# Patient Record
Sex: MALE | Race: BLACK OR AFRICAN AMERICAN | NOT HISPANIC OR LATINO | Employment: FULL TIME | URBAN - METROPOLITAN AREA
[De-identification: names, ages, dates, MRNs, and addresses within clinical notes are randomized per-mention and may not be internally consistent; named-entity substitution may affect disease eponyms.]

---

## 2024-02-26 ENCOUNTER — APPOINTMENT (OUTPATIENT)
Dept: RADIOLOGY | Facility: HOSPITAL | Age: 54
End: 2024-02-26
Payer: COMMERCIAL

## 2024-02-26 ENCOUNTER — HOSPITAL ENCOUNTER (OUTPATIENT)
Facility: HOSPITAL | Age: 54
Setting detail: OBSERVATION
Discharge: HOME | End: 2024-02-27
Attending: STUDENT IN AN ORGANIZED HEALTH CARE EDUCATION/TRAINING PROGRAM | Admitting: INTERNAL MEDICINE
Payer: COMMERCIAL

## 2024-02-26 ENCOUNTER — APPOINTMENT (OUTPATIENT)
Dept: CARDIOLOGY | Facility: HOSPITAL | Age: 54
End: 2024-02-26
Payer: COMMERCIAL

## 2024-02-26 DIAGNOSIS — I48.19 PERSISTENT ATRIAL FIBRILLATION (MULTI): ICD-10-CM

## 2024-02-26 DIAGNOSIS — I50.9 ACUTE CONGESTIVE HEART FAILURE, UNSPECIFIED HEART FAILURE TYPE (MULTI): Primary | ICD-10-CM

## 2024-02-26 DIAGNOSIS — I50.20 UNSPECIFIED SYSTOLIC (CONGESTIVE) HEART FAILURE (MULTI): ICD-10-CM

## 2024-02-26 DIAGNOSIS — I10 HYPERTENSION, UNSPECIFIED TYPE: ICD-10-CM

## 2024-02-26 DIAGNOSIS — I10 PRIMARY HYPERTENSION: ICD-10-CM

## 2024-02-26 DIAGNOSIS — I10 ESSENTIAL HYPERTENSION: Chronic | ICD-10-CM

## 2024-02-26 DIAGNOSIS — N17.9 AKI (ACUTE KIDNEY INJURY) (CMS-HCC): ICD-10-CM

## 2024-02-26 LAB
ALBUMIN SERPL BCP-MCNC: 4.1 G/DL (ref 3.4–5)
ALP SERPL-CCNC: 68 U/L (ref 33–120)
ALT SERPL W P-5'-P-CCNC: 13 U/L (ref 10–52)
ANION GAP SERPL CALC-SCNC: 12 MMOL/L (ref 10–20)
AST SERPL W P-5'-P-CCNC: 15 U/L (ref 9–39)
BASOPHILS # BLD AUTO: 0.06 X10*3/UL (ref 0–0.1)
BASOPHILS NFR BLD AUTO: 1 %
BILIRUB SERPL-MCNC: 1.6 MG/DL (ref 0–1.2)
BNP SERPL-MCNC: 669 PG/ML (ref 0–99)
BUN SERPL-MCNC: 22 MG/DL (ref 6–23)
CALCIUM SERPL-MCNC: 9.3 MG/DL (ref 8.6–10.3)
CARDIAC TROPONIN I PNL SERPL HS: 30 NG/L (ref 0–20)
CARDIAC TROPONIN I PNL SERPL HS: 33 NG/L (ref 0–20)
CHLORIDE SERPL-SCNC: 105 MMOL/L (ref 98–107)
CO2 SERPL-SCNC: 26 MMOL/L (ref 21–32)
CREAT SERPL-MCNC: 1.61 MG/DL (ref 0.5–1.3)
EGFRCR SERPLBLD CKD-EPI 2021: 51 ML/MIN/1.73M*2
EOSINOPHIL # BLD AUTO: 0.22 X10*3/UL (ref 0–0.7)
EOSINOPHIL NFR BLD AUTO: 3.5 %
ERYTHROCYTE [DISTWIDTH] IN BLOOD BY AUTOMATED COUNT: 15.4 % (ref 11.5–14.5)
GLUCOSE SERPL-MCNC: 107 MG/DL (ref 74–99)
HCT VFR BLD AUTO: 40.5 % (ref 41–52)
HGB BLD-MCNC: 13.3 G/DL (ref 13.5–17.5)
HOLD SPECIMEN: NORMAL
IMM GRANULOCYTES # BLD AUTO: 0.02 X10*3/UL (ref 0–0.7)
IMM GRANULOCYTES NFR BLD AUTO: 0.3 % (ref 0–0.9)
LYMPHOCYTES # BLD AUTO: 1.34 X10*3/UL (ref 1.2–4.8)
LYMPHOCYTES NFR BLD AUTO: 21.3 %
MCH RBC QN AUTO: 30.2 PG (ref 26–34)
MCHC RBC AUTO-ENTMCNC: 32.8 G/DL (ref 32–36)
MCV RBC AUTO: 92 FL (ref 80–100)
MONOCYTES # BLD AUTO: 0.62 X10*3/UL (ref 0.1–1)
MONOCYTES NFR BLD AUTO: 9.8 %
NEUTROPHILS # BLD AUTO: 4.04 X10*3/UL (ref 1.2–7.7)
NEUTROPHILS NFR BLD AUTO: 64.1 %
NRBC BLD-RTO: 0 /100 WBCS (ref 0–0)
PLATELET # BLD AUTO: 160 X10*3/UL (ref 150–450)
POTASSIUM SERPL-SCNC: 3.8 MMOL/L (ref 3.5–5.3)
PROT SERPL-MCNC: 7.1 G/DL (ref 6.4–8.2)
RBC # BLD AUTO: 4.4 X10*6/UL (ref 4.5–5.9)
SODIUM SERPL-SCNC: 139 MMOL/L (ref 136–145)
WBC # BLD AUTO: 6.3 X10*3/UL (ref 4.4–11.3)

## 2024-02-26 PROCEDURE — 85025 COMPLETE CBC W/AUTO DIFF WBC: CPT | Performed by: STUDENT IN AN ORGANIZED HEALTH CARE EDUCATION/TRAINING PROGRAM

## 2024-02-26 PROCEDURE — 36415 COLL VENOUS BLD VENIPUNCTURE: CPT | Performed by: STUDENT IN AN ORGANIZED HEALTH CARE EDUCATION/TRAINING PROGRAM

## 2024-02-26 PROCEDURE — 93005 ELECTROCARDIOGRAM TRACING: CPT

## 2024-02-26 PROCEDURE — 71046 X-RAY EXAM CHEST 2 VIEWS: CPT | Mod: FOREIGN READ | Performed by: RADIOLOGY

## 2024-02-26 PROCEDURE — 2500000004 HC RX 250 GENERAL PHARMACY W/ HCPCS (ALT 636 FOR OP/ED): Performed by: STUDENT IN AN ORGANIZED HEALTH CARE EDUCATION/TRAINING PROGRAM

## 2024-02-26 PROCEDURE — 96375 TX/PRO/DX INJ NEW DRUG ADDON: CPT | Mod: 59

## 2024-02-26 PROCEDURE — 84484 ASSAY OF TROPONIN QUANT: CPT | Performed by: STUDENT IN AN ORGANIZED HEALTH CARE EDUCATION/TRAINING PROGRAM

## 2024-02-26 PROCEDURE — 2500000001 HC RX 250 WO HCPCS SELF ADMINISTERED DRUGS (ALT 637 FOR MEDICARE OP): Performed by: STUDENT IN AN ORGANIZED HEALTH CARE EDUCATION/TRAINING PROGRAM

## 2024-02-26 PROCEDURE — 99285 EMERGENCY DEPT VISIT HI MDM: CPT | Mod: 25

## 2024-02-26 PROCEDURE — 96374 THER/PROPH/DIAG INJ IV PUSH: CPT | Mod: 59

## 2024-02-26 PROCEDURE — 80053 COMPREHEN METABOLIC PANEL: CPT | Performed by: STUDENT IN AN ORGANIZED HEALTH CARE EDUCATION/TRAINING PROGRAM

## 2024-02-26 PROCEDURE — 71046 X-RAY EXAM CHEST 2 VIEWS: CPT

## 2024-02-26 PROCEDURE — 83880 ASSAY OF NATRIURETIC PEPTIDE: CPT | Performed by: STUDENT IN AN ORGANIZED HEALTH CARE EDUCATION/TRAINING PROGRAM

## 2024-02-26 RX ORDER — AMLODIPINE BESYLATE 10 MG/1
10 TABLET ORAL ONCE
Status: COMPLETED | OUTPATIENT
Start: 2024-02-26 | End: 2024-02-26

## 2024-02-26 RX ORDER — SPIRONOLACTONE 25 MG/1
25 TABLET ORAL
COMMUNITY
Start: 2022-10-17 | End: 2024-02-27 | Stop reason: SDUPTHER

## 2024-02-26 RX ORDER — NITROGLYCERIN 0.4 MG/1
0.4 TABLET SUBLINGUAL ONCE
Status: COMPLETED | OUTPATIENT
Start: 2024-02-26 | End: 2024-02-26

## 2024-02-26 RX ORDER — AMLODIPINE BESYLATE 10 MG/1
10 TABLET ORAL
COMMUNITY
Start: 2020-11-24 | End: 2024-02-27 | Stop reason: SDUPTHER

## 2024-02-26 RX ORDER — METOPROLOL SUCCINATE 25 MG/1
25 TABLET, EXTENDED RELEASE ORAL
COMMUNITY
Start: 2020-11-24 | End: 2024-02-27

## 2024-02-26 RX ORDER — CARVEDILOL 12.5 MG/1
1 TABLET ORAL
COMMUNITY
Start: 2022-10-17 | End: 2024-02-27

## 2024-02-26 RX ORDER — HYDRALAZINE HYDROCHLORIDE 20 MG/ML
10 INJECTION INTRAMUSCULAR; INTRAVENOUS ONCE
Status: COMPLETED | OUTPATIENT
Start: 2024-02-26 | End: 2024-02-26

## 2024-02-26 RX ORDER — FUROSEMIDE 10 MG/ML
40 INJECTION INTRAMUSCULAR; INTRAVENOUS ONCE
Status: COMPLETED | OUTPATIENT
Start: 2024-02-26 | End: 2024-02-26

## 2024-02-26 RX ORDER — CLONIDINE HYDROCHLORIDE 0.1 MG/1
0.1 TABLET ORAL ONCE
Status: COMPLETED | OUTPATIENT
Start: 2024-02-26 | End: 2024-02-26

## 2024-02-26 RX ORDER — ATORVASTATIN CALCIUM 40 MG/1
1 TABLET, FILM COATED ORAL DAILY
COMMUNITY
Start: 2023-10-10 | End: 2024-02-27 | Stop reason: SDUPTHER

## 2024-02-26 RX ADMIN — HYDRALAZINE HYDROCHLORIDE 10 MG: 20 INJECTION INTRAMUSCULAR; INTRAVENOUS at 19:10

## 2024-02-26 RX ADMIN — CLONIDINE HYDROCHLORIDE 0.1 MG: 0.1 TABLET ORAL at 21:24

## 2024-02-26 RX ADMIN — AMLODIPINE BESYLATE 10 MG: 10 TABLET ORAL at 21:24

## 2024-02-26 RX ADMIN — NITROGLYCERIN 0.4 MG: 0.4 TABLET SUBLINGUAL at 22:26

## 2024-02-26 RX ADMIN — FUROSEMIDE 40 MG: 10 INJECTION, SOLUTION INTRAMUSCULAR; INTRAVENOUS at 21:24

## 2024-02-26 ASSESSMENT — PAIN SCALES - GENERAL
PAINLEVEL_OUTOF10: 0 - NO PAIN

## 2024-02-26 ASSESSMENT — COLUMBIA-SUICIDE SEVERITY RATING SCALE - C-SSRS
6. HAVE YOU EVER DONE ANYTHING, STARTED TO DO ANYTHING, OR PREPARED TO DO ANYTHING TO END YOUR LIFE?: NO
2. HAVE YOU ACTUALLY HAD ANY THOUGHTS OF KILLING YOURSELF?: NO
1. IN THE PAST MONTH, HAVE YOU WISHED YOU WERE DEAD OR WISHED YOU COULD GO TO SLEEP AND NOT WAKE UP?: NO

## 2024-02-26 ASSESSMENT — PAIN - FUNCTIONAL ASSESSMENT: PAIN_FUNCTIONAL_ASSESSMENT: 0-10

## 2024-02-26 NOTE — ED TRIAGE NOTES
PT PRESENTS TO THE ED FOR HTN, AND JUNCTIONAL BRADYCARDIA. PT ENDORSES THAT HE WAS AT  AND THEY TOLD HIM TO COME TO THE ED. PT STATES THAT HE RAN OUT OF HIS BP MEDICATION 2 WEEKS AGO. PT DENIES CHEST PAIN, OR A HEADACHE. PT DENIES SOB WITH THIS.

## 2024-02-27 ENCOUNTER — PHARMACY VISIT (OUTPATIENT)
Dept: PHARMACY | Facility: CLINIC | Age: 54
End: 2024-02-27
Payer: MEDICARE

## 2024-02-27 ENCOUNTER — APPOINTMENT (OUTPATIENT)
Dept: CARDIOLOGY | Facility: HOSPITAL | Age: 54
End: 2024-02-27
Payer: COMMERCIAL

## 2024-02-27 VITALS
TEMPERATURE: 96.8 F | SYSTOLIC BLOOD PRESSURE: 141 MMHG | WEIGHT: 165 LBS | RESPIRATION RATE: 17 BRPM | DIASTOLIC BLOOD PRESSURE: 76 MMHG | HEART RATE: 52 BPM | HEIGHT: 69 IN | OXYGEN SATURATION: 96 % | BODY MASS INDEX: 24.44 KG/M2

## 2024-02-27 PROBLEM — R00.1 BRADYCARDIA: Status: ACTIVE | Noted: 2024-02-27

## 2024-02-27 PROBLEM — I50.30 (HFPEF) HEART FAILURE WITH PRESERVED EJECTION FRACTION (MULTI): Chronic | Status: ACTIVE | Noted: 2024-02-27

## 2024-02-27 PROBLEM — I48.91 ATRIAL FIBRILLATION (MULTI): Status: ACTIVE | Noted: 2024-02-27

## 2024-02-27 PROBLEM — Z91.199 NONCOMPLIANCE: Status: ACTIVE | Noted: 2024-02-27

## 2024-02-27 PROBLEM — N18.2 CKD (CHRONIC KIDNEY DISEASE) STAGE 2, GFR 60-89 ML/MIN: Chronic | Status: ACTIVE | Noted: 2024-02-27

## 2024-02-27 PROBLEM — E78.2 MIXED HYPERLIPIDEMIA: Chronic | Status: ACTIVE | Noted: 2024-02-27

## 2024-02-27 PROBLEM — I10 ESSENTIAL HYPERTENSION: Chronic | Status: ACTIVE | Noted: 2024-02-27

## 2024-02-27 PROBLEM — I10 HTN (HYPERTENSION) WITH GOAL TO BE DETERMINED: Status: ACTIVE | Noted: 2024-02-27

## 2024-02-27 LAB
ALBUMIN SERPL BCP-MCNC: 4.1 G/DL (ref 3.4–5)
ALP SERPL-CCNC: 67 U/L (ref 33–120)
ALT SERPL W P-5'-P-CCNC: 12 U/L (ref 10–52)
AMPHETAMINES UR QL SCN: NORMAL
ANION GAP SERPL CALC-SCNC: 16 MMOL/L (ref 10–20)
AORTIC VALVE MEAN GRADIENT: 2 MMHG
AORTIC VALVE PEAK VELOCITY: 1.08 M/S
AST SERPL W P-5'-P-CCNC: 15 U/L (ref 9–39)
AV PEAK GRADIENT: 4.7 MMHG
AVA (PEAK VEL): 3.23 CM2
AVA (VTI): 3.14 CM2
BARBITURATES UR QL SCN: NORMAL
BENZODIAZ UR QL SCN: NORMAL
BILIRUB DIRECT SERPL-MCNC: 0.4 MG/DL (ref 0–0.3)
BILIRUB SERPL-MCNC: 2.2 MG/DL (ref 0–1.2)
BUN SERPL-MCNC: 20 MG/DL (ref 6–23)
BZE UR QL SCN: NORMAL
CALCIUM SERPL-MCNC: 8.8 MG/DL (ref 8.6–10.3)
CANNABINOIDS UR QL SCN: NORMAL
CARDIAC TROPONIN I PNL SERPL HS: 30 NG/L (ref 0–20)
CHLORIDE SERPL-SCNC: 102 MMOL/L (ref 98–107)
CO2 SERPL-SCNC: 24 MMOL/L (ref 21–32)
CREAT SERPL-MCNC: 1.37 MG/DL (ref 0.5–1.3)
EGFRCR SERPLBLD CKD-EPI 2021: 61 ML/MIN/1.73M*2
EJECTION FRACTION APICAL 4 CHAMBER: 53.5
EJECTION FRACTION: 55 %
ERYTHROCYTE [DISTWIDTH] IN BLOOD BY AUTOMATED COUNT: 15.5 % (ref 11.5–14.5)
FENTANYL+NORFENTANYL UR QL SCN: NORMAL
GLUCOSE SERPL-MCNC: 93 MG/DL (ref 74–99)
HCT VFR BLD AUTO: 40.5 % (ref 41–52)
HGB BLD-MCNC: 13.1 G/DL (ref 13.5–17.5)
LEFT ATRIUM VOLUME AREA LENGTH INDEX BSA: 55.8 ML/M2
LEFT VENTRICLE INTERNAL DIMENSION DIASTOLE: 4.5 CM (ref 3.5–6)
LEFT VENTRICULAR OUTFLOW TRACT DIAMETER: 2.2 CM
MAGNESIUM SERPL-MCNC: 2.2 MG/DL (ref 1.6–2.4)
MCH RBC QN AUTO: 30.4 PG (ref 26–34)
MCHC RBC AUTO-ENTMCNC: 32.3 G/DL (ref 32–36)
MCV RBC AUTO: 94 FL (ref 80–100)
NRBC BLD-RTO: 0 /100 WBCS (ref 0–0)
OPIATES UR QL SCN: NORMAL
OXYCODONE+OXYMORPHONE UR QL SCN: NORMAL
PCP UR QL SCN: NORMAL
PLATELET # BLD AUTO: 147 X10*3/UL (ref 150–450)
POTASSIUM SERPL-SCNC: 3.6 MMOL/L (ref 3.5–5.3)
PROT SERPL-MCNC: 7 G/DL (ref 6.4–8.2)
RBC # BLD AUTO: 4.31 X10*6/UL (ref 4.5–5.9)
RIGHT VENTRICLE PEAK SYSTOLIC PRESSURE: 34.8 MMHG
SODIUM SERPL-SCNC: 138 MMOL/L (ref 136–145)
TRICUSPID ANNULAR PLANE SYSTOLIC EXCURSION: 1.5 CM
WBC # BLD AUTO: 7 X10*3/UL (ref 4.4–11.3)

## 2024-02-27 PROCEDURE — RXMED WILLOW AMBULATORY MEDICATION CHARGE

## 2024-02-27 PROCEDURE — G0378 HOSPITAL OBSERVATION PER HR: HCPCS

## 2024-02-27 PROCEDURE — 96376 TX/PRO/DX INJ SAME DRUG ADON: CPT | Mod: 59

## 2024-02-27 PROCEDURE — 99223 1ST HOSP IP/OBS HIGH 75: CPT | Performed by: INTERNAL MEDICINE

## 2024-02-27 PROCEDURE — 2500000001 HC RX 250 WO HCPCS SELF ADMINISTERED DRUGS (ALT 637 FOR MEDICARE OP): Performed by: INTERNAL MEDICINE

## 2024-02-27 PROCEDURE — 2500000001 HC RX 250 WO HCPCS SELF ADMINISTERED DRUGS (ALT 637 FOR MEDICARE OP): Performed by: PHYSICIAN ASSISTANT

## 2024-02-27 PROCEDURE — 99222 1ST HOSP IP/OBS MODERATE 55: CPT | Performed by: PHYSICIAN ASSISTANT

## 2024-02-27 PROCEDURE — 82248 BILIRUBIN DIRECT: CPT | Performed by: NURSE PRACTITIONER

## 2024-02-27 PROCEDURE — 80307 DRUG TEST PRSMV CHEM ANLYZR: CPT | Performed by: PHYSICIAN ASSISTANT

## 2024-02-27 PROCEDURE — 2500000001 HC RX 250 WO HCPCS SELF ADMINISTERED DRUGS (ALT 637 FOR MEDICARE OP): Performed by: PHARMACIST

## 2024-02-27 PROCEDURE — 85027 COMPLETE CBC AUTOMATED: CPT | Performed by: PHYSICIAN ASSISTANT

## 2024-02-27 PROCEDURE — 80053 COMPREHEN METABOLIC PANEL: CPT | Performed by: PHYSICIAN ASSISTANT

## 2024-02-27 PROCEDURE — 83735 ASSAY OF MAGNESIUM: CPT | Performed by: PHYSICIAN ASSISTANT

## 2024-02-27 PROCEDURE — 93306 TTE W/DOPPLER COMPLETE: CPT

## 2024-02-27 PROCEDURE — 2500000002 HC RX 250 W HCPCS SELF ADMINISTERED DRUGS (ALT 637 FOR MEDICARE OP, ALT 636 FOR OP/ED): Performed by: PHYSICIAN ASSISTANT

## 2024-02-27 PROCEDURE — 36415 COLL VENOUS BLD VENIPUNCTURE: CPT | Performed by: PHYSICIAN ASSISTANT

## 2024-02-27 PROCEDURE — 84484 ASSAY OF TROPONIN QUANT: CPT | Performed by: PHYSICIAN ASSISTANT

## 2024-02-27 PROCEDURE — 2500000002 HC RX 250 W HCPCS SELF ADMINISTERED DRUGS (ALT 637 FOR MEDICARE OP, ALT 636 FOR OP/ED): Performed by: PHARMACIST

## 2024-02-27 PROCEDURE — 93306 TTE W/DOPPLER COMPLETE: CPT | Performed by: INTERNAL MEDICINE

## 2024-02-27 PROCEDURE — 2500000004 HC RX 250 GENERAL PHARMACY W/ HCPCS (ALT 636 FOR OP/ED): Performed by: PHYSICIAN ASSISTANT

## 2024-02-27 RX ORDER — PRAZOSIN HYDROCHLORIDE 1 MG/1
1 CAPSULE ORAL 2 TIMES DAILY
Qty: 60 CAPSULE | Refills: 0 | Status: SHIPPED | OUTPATIENT
Start: 2024-02-27 | End: 2024-05-01 | Stop reason: SDUPTHER

## 2024-02-27 RX ORDER — FUROSEMIDE 20 MG/1
20 TABLET ORAL DAILY
Qty: 30 TABLET | Refills: 0 | Status: SHIPPED | OUTPATIENT
Start: 2024-02-27 | End: 2024-02-27 | Stop reason: HOSPADM

## 2024-02-27 RX ORDER — PANTOPRAZOLE SODIUM 40 MG/10ML
40 INJECTION, POWDER, LYOPHILIZED, FOR SOLUTION INTRAVENOUS
Status: DISCONTINUED | OUTPATIENT
Start: 2024-02-27 | End: 2024-02-27 | Stop reason: HOSPADM

## 2024-02-27 RX ORDER — AMLODIPINE BESYLATE 10 MG/1
10 TABLET ORAL DAILY
Status: DISCONTINUED | OUTPATIENT
Start: 2024-02-27 | End: 2024-02-27 | Stop reason: HOSPADM

## 2024-02-27 RX ORDER — FUROSEMIDE 10 MG/ML
40 INJECTION INTRAMUSCULAR; INTRAVENOUS
Status: DISCONTINUED | OUTPATIENT
Start: 2024-02-27 | End: 2024-02-27 | Stop reason: HOSPADM

## 2024-02-27 RX ORDER — SPIRONOLACTONE 25 MG/1
25 TABLET ORAL
Status: DISCONTINUED | OUTPATIENT
Start: 2024-02-27 | End: 2024-02-27

## 2024-02-27 RX ORDER — SPIRONOLACTONE 25 MG/1
25 TABLET ORAL
Qty: 30 TABLET | Refills: 0 | Status: SHIPPED | OUTPATIENT
Start: 2024-02-27 | End: 2024-05-01 | Stop reason: SDUPTHER

## 2024-02-27 RX ORDER — CARVEDILOL 12.5 MG/1
12.5 TABLET ORAL
Status: DISCONTINUED | OUTPATIENT
Start: 2024-02-27 | End: 2024-02-27

## 2024-02-27 RX ORDER — ATORVASTATIN CALCIUM 40 MG/1
40 TABLET, FILM COATED ORAL DAILY
Qty: 30 TABLET | Refills: 0 | Status: SHIPPED | OUTPATIENT
Start: 2024-02-27 | End: 2024-05-01 | Stop reason: SDUPTHER

## 2024-02-27 RX ORDER — AMLODIPINE BESYLATE 10 MG/1
10 TABLET ORAL
Status: DISCONTINUED | OUTPATIENT
Start: 2024-02-27 | End: 2024-02-27

## 2024-02-27 RX ORDER — AMLODIPINE BESYLATE 10 MG/1
10 TABLET ORAL
Qty: 30 TABLET | Refills: 0 | Status: SHIPPED | OUTPATIENT
Start: 2024-02-27 | End: 2024-05-01 | Stop reason: SDUPTHER

## 2024-02-27 RX ORDER — DOCUSATE SODIUM 100 MG/1
100 CAPSULE, LIQUID FILLED ORAL 2 TIMES DAILY
Status: DISCONTINUED | OUTPATIENT
Start: 2024-02-27 | End: 2024-02-27 | Stop reason: HOSPADM

## 2024-02-27 RX ORDER — SPIRONOLACTONE 25 MG/1
25 TABLET ORAL DAILY
Status: DISCONTINUED | OUTPATIENT
Start: 2024-02-27 | End: 2024-02-27 | Stop reason: HOSPADM

## 2024-02-27 RX ORDER — TALC
3 POWDER (GRAM) TOPICAL
Status: DISCONTINUED | OUTPATIENT
Start: 2024-02-27 | End: 2024-02-27 | Stop reason: HOSPADM

## 2024-02-27 RX ORDER — PANTOPRAZOLE SODIUM 40 MG/1
40 TABLET, DELAYED RELEASE ORAL
Status: DISCONTINUED | OUTPATIENT
Start: 2024-02-27 | End: 2024-02-27 | Stop reason: HOSPADM

## 2024-02-27 RX ORDER — PRAZOSIN HYDROCHLORIDE 1 MG/1
1 CAPSULE ORAL 2 TIMES DAILY
Status: DISCONTINUED | OUTPATIENT
Start: 2024-02-27 | End: 2024-02-27 | Stop reason: HOSPADM

## 2024-02-27 RX ORDER — FUROSEMIDE 20 MG/1
20 TABLET ORAL DAILY
COMMUNITY
End: 2024-02-27

## 2024-02-27 RX ORDER — FUROSEMIDE 10 MG/ML
40 INJECTION INTRAMUSCULAR; INTRAVENOUS
Status: DISCONTINUED | OUTPATIENT
Start: 2024-02-27 | End: 2024-02-27

## 2024-02-27 RX ORDER — DICYCLOMINE HYDROCHLORIDE 10 MG/1
10 CAPSULE ORAL 4 TIMES DAILY PRN
Status: DISCONTINUED | OUTPATIENT
Start: 2024-02-27 | End: 2024-02-27 | Stop reason: HOSPADM

## 2024-02-27 RX ORDER — ATORVASTATIN CALCIUM 40 MG/1
40 TABLET, FILM COATED ORAL NIGHTLY
Status: DISCONTINUED | OUTPATIENT
Start: 2024-02-27 | End: 2024-02-27 | Stop reason: HOSPADM

## 2024-02-27 RX ORDER — ACETAMINOPHEN 325 MG/1
950 TABLET ORAL EVERY 6 HOURS PRN
Status: DISCONTINUED | OUTPATIENT
Start: 2024-02-27 | End: 2024-02-27 | Stop reason: HOSPADM

## 2024-02-27 RX ADMIN — ACETAMINOPHEN 975 MG: 325 TABLET ORAL at 03:02

## 2024-02-27 RX ADMIN — APIXABAN 5 MG: 5 TABLET, FILM COATED ORAL at 10:02

## 2024-02-27 RX ADMIN — SPIRONOLACTONE 25 MG: 25 TABLET ORAL at 09:55

## 2024-02-27 RX ADMIN — ACETAMINOPHEN 975 MG: 325 TABLET ORAL at 10:02

## 2024-02-27 RX ADMIN — FUROSEMIDE 40 MG: 10 INJECTION, SOLUTION INTRAMUSCULAR; INTRAVENOUS at 09:55

## 2024-02-27 RX ADMIN — AMLODIPINE BESYLATE 10 MG: 10 TABLET ORAL at 09:55

## 2024-02-27 RX ADMIN — PRAZOSIN HYDROCHLORIDE 1 MG: 1 CAPSULE ORAL at 11:05

## 2024-02-27 RX ADMIN — PANTOPRAZOLE SODIUM 40 MG: 40 TABLET, DELAYED RELEASE ORAL at 09:55

## 2024-02-27 ASSESSMENT — ENCOUNTER SYMPTOMS
NAUSEA: 0
PALPITATIONS: 0
SHORTNESS OF BREATH: 1
CONSTITUTIONAL NEGATIVE: 1
PSYCHIATRIC NEGATIVE: 1
ENDOCRINE NEGATIVE: 1
NEUROLOGICAL NEGATIVE: 1
ABDOMINAL PAIN: 0
ALLERGIC/IMMUNOLOGIC NEGATIVE: 1
EYES NEGATIVE: 1
MUSCULOSKELETAL NEGATIVE: 1
VOMITING: 0
HEMATOLOGIC/LYMPHATIC NEGATIVE: 1

## 2024-02-27 ASSESSMENT — PAIN - FUNCTIONAL ASSESSMENT
PAIN_FUNCTIONAL_ASSESSMENT: 0-10

## 2024-02-27 ASSESSMENT — PAIN SCALES - GENERAL
PAINLEVEL_OUTOF10: 0 - NO PAIN
PAINLEVEL_OUTOF10: 0 - NO PAIN
PAINLEVEL_OUTOF10: 8

## 2024-02-27 ASSESSMENT — ACTIVITIES OF DAILY LIVING (ADL): LACK_OF_TRANSPORTATION: NO

## 2024-02-27 ASSESSMENT — PAIN DESCRIPTION - DESCRIPTORS: DESCRIPTORS: ACHING

## 2024-02-27 ASSESSMENT — PAIN DESCRIPTION - LOCATION: LOCATION: HEAD

## 2024-02-27 NOTE — PROGRESS NOTES
02/27/24 0633   [facility] Disability Status   Are you deaf or do you have serious difficulty hearing? N   Are you blind or do you have serious difficulty seeing, even when wearing glasses? N   Because of a physical, mental, or emotional condition, do you have serious difficulty concentrating, remembering, or making decisions? (5 years old or older) N   Do you have serious difficulty walking or climbing stairs? N   Do you have serious difficulty dressing or bathing? N   Because of a physical, mental, or emotional condition, do you have serious difficulty doing errands alone such as visiting the doctor? N

## 2024-02-27 NOTE — ED PROVIDER NOTES
EMERGENCY MEDICINE EVALUATION NOTE    History of Present Illness     Chief Complaint:   Chief Complaint   Patient presents with    Hypertension       HPI: Jerrod Jimenez is a 53 y.o. male with past medical history of afib, hypertension ,and hyperlipidemia who presents with complaint of hypertension and shortness of breath.  Patient states he has been not taking his blood pressure medication over the past several weeks.  He is unsure what he is on although on chart check was supposed to be on amlodipine, Coreg, and Lasix.  Patient states he also has developed some mild exertional dyspnea.  He denies any history of heart failure, COPD, or asthma.  Denies any cough, fever, chills, chest pain, Sage pain, nausea, vomiting.  He states his blood pressure was with a systolic in the 190s which prompted him to come in for evaluation.    Previous History     Past Medical History:   Diagnosis Date    (HFpEF) heart failure with preserved ejection fraction (CMS/HCC)     Atrial fibrillation (CMS/HCC)     HLD (hyperlipidemia)     Hypertension      Past Surgical History:   Procedure Laterality Date    CARDIOVERSION  09/14/2021    DCCV for atrial fib with RVR     Social History     Tobacco Use    Smoking status: Former     Types: Cigarettes    Smokeless tobacco: Never   Substance Use Topics    Alcohol use: Not Currently    Drug use: Not Currently     No family history on file.  No Known Allergies  Current Outpatient Medications   Medication Instructions    amLODIPine (NORVASC) 10 mg, oral, Daily RT    atorvastatin (LIPITOR) 40 mg, oral, Daily    Eliquis 5 mg, oral, Every 12 hours    prazosin (MINIPRESS) 1 mg, oral, 2 times daily    spironolactone (ALDACTONE) 25 mg, oral, Daily RT       Physical Exam     Appearance: Alert, oriented , cooperative,  in acute distress. Well nourished & well hydrated.     Skin: Intact,  dry skin, no lesions, rash, petechiae or purpura.      Eyes: PERRLA, EOMs intact,  Conjunctiva pink with no redness  or exudates. Cornea & anterior chamber are clear, Eyelids without lesions. No scleral icterus.      ENT: Hearing grossly intact. External auditory canals patent, tympanic membranes intact with visible landmarks. Nares patent, mucus membranes moist. Dentition without lesions. Pharynx clear, uvula midline.      Neck: Supple, without meningismus. Thyroid not palpable. Trachea at midline. No lymphadenopathy.     Pulmonary: Diminished breath sounds throughout with mild expiratory Rales. No rhonchi or wheezing. No accessory muscle use or stridor.     Cardiac: Bradycardic and otherwise normal S1, S2 without murmur, rub, gallop or extrasystole. No JVD, Carotids without bruits.     Abdomen: Soft, nontender, active bowel sounds.  No palpable organomegaly.  No rebound or guarding.  No CVA tenderness.     Genitourinary: Exam deferred.     Musculoskeletal: Full range of motion. no pain, edema, or deformity. Pulses full and equal. No cyanosis or clubbing.      Neurological:  Cranial nerves II through XII are grossly intact, finger-nose touch is normal, normal sensation, no weakness, no focal findings identified.     Psychiatric: Appropriate mood and affect.      Results     Labs Reviewed   CBC WITH AUTO DIFFERENTIAL - Abnormal       Result Value    WBC 6.3      nRBC 0.0      RBC 4.40 (*)     Hemoglobin 13.3 (*)     Hematocrit 40.5 (*)     MCV 92      MCH 30.2      MCHC 32.8      RDW 15.4 (*)     Platelets 160      Neutrophils % 64.1      Immature Granulocytes %, Automated 0.3      Lymphocytes % 21.3      Monocytes % 9.8      Eosinophils % 3.5      Basophils % 1.0      Neutrophils Absolute 4.04      Immature Granulocytes Absolute, Automated 0.02      Lymphocytes Absolute 1.34      Monocytes Absolute 0.62      Eosinophils Absolute 0.22      Basophils Absolute 0.06     COMPREHENSIVE METABOLIC PANEL - Abnormal    Glucose 107 (*)     Sodium 139      Potassium 3.8      Chloride 105      Bicarbonate 26      Anion Gap 12      Urea  Nitrogen 22      Creatinine 1.61 (*)     eGFR 51 (*)     Calcium 9.3      Albumin 4.1      Alkaline Phosphatase 68      Total Protein 7.1      AST 15      Bilirubin, Total 1.6 (*)     ALT 13     B-TYPE NATRIURETIC PEPTIDE - Abnormal     (*)     Narrative:        <100 pg/mL - Heart failure unlikely  100-299 pg/mL - Intermediate probability of acute heart                  failure exacerbation. Correlate with clinical                  context and patient history.    >=300 pg/mL - Heart Failure likely. Correlate with clinical                  context and patient history.    BNP testing is performed using different testing methodology at [facility] than at other [facility]. Direct result comparisons should only be made within the same method.      TROPONIN I, HIGH SENSITIVITY - Abnormal    Troponin I, High Sensitivity 30 (*)     Narrative:     Less than 99th percentile of normal range cutoff-  Female and children under 18 years old <14 ng/L; Male <21 ng/L: Negative  Repeat testing should be performed if clinically indicated.     Female and children under 18 years old 14-50 ng/L; Male 21-50 ng/L:  Consistent with possible cardiac damage and possible increased clinical   risk. Serial measurements may help to assess extent of myocardial damage.     >50 ng/L: Consistent with cardiac damage, increased clinical risk and  myocardial infarction. Serial measurements may help assess extent of   myocardial damage.      NOTE: Children less than 1 year old may have higher baseline troponin   levels and results should be interpreted in conjunction with the overall   clinical context.     NOTE: Troponin I testing is performed using a different   testing methodology at [facility] than at other   [facility]. Direct result comparisons should only   be made within the same method.   TROPONIN I, HIGH SENSITIVITY - Abnormal    Troponin I, High Sensitivity 33 (*)     Narrative:     Less than 99th  percentile of normal range cutoff-  Female and children under 18 years old <14 ng/L; Male <21 ng/L: Negative  Repeat testing should be performed if clinically indicated.     Female and children under 18 years old 14-50 ng/L; Male 21-50 ng/L:  Consistent with possible cardiac damage and possible increased clinical   risk. Serial measurements may help to assess extent of myocardial damage.     >50 ng/L: Consistent with cardiac damage, increased clinical risk and  myocardial infarction. Serial measurements may help assess extent of   myocardial damage.      NOTE: Children less than 1 year old may have higher baseline troponin   levels and results should be interpreted in conjunction with the overall   clinical context.     NOTE: Troponin I testing is performed using a different   testing methodology at [facility] than at other   [facility]. Direct result comparisons should only   be made within the same method.   CBC - Abnormal    WBC 7.0      nRBC 0.0      RBC 4.31 (*)     Hemoglobin 13.1 (*)     Hematocrit 40.5 (*)     MCV 94      MCH 30.4      MCHC 32.3      RDW 15.5 (*)     Platelets 147 (*)    BASIC METABOLIC PANEL - Abnormal    Glucose 93      Sodium 138      Potassium 3.6      Chloride 102      Bicarbonate 24      Anion Gap 16      Urea Nitrogen 20      Creatinine 1.37 (*)     eGFR 61      Calcium 8.8     TROPONIN I, HIGH SENSITIVITY - Abnormal    Troponin I, High Sensitivity 30 (*)     Narrative:     Less than 99th percentile of normal range cutoff-  Female and children under 18 years old <14 ng/L; Male <21 ng/L: Negative  Repeat testing should be performed if clinically indicated.     Female and children under 18 years old 14-50 ng/L; Male 21-50 ng/L:  Consistent with possible cardiac damage and possible increased clinical   risk. Serial measurements may help to assess extent of myocardial damage.     >50 ng/L: Consistent with cardiac damage, increased clinical risk and  myocardial  infarction. Serial measurements may help assess extent of   myocardial damage.      NOTE: Children less than 1 year old may have higher baseline troponin   levels and results should be interpreted in conjunction with the overall   clinical context.     NOTE: Troponin I testing is performed using a different   testing methodology at [facility] than at other   [facility]. Direct result comparisons should only   be made within the same method.   HEPATIC FUNCTION PANEL - Abnormal    Albumin 4.1      Bilirubin, Total 2.2 (*)     Bilirubin, Direct 0.4 (*)     Alkaline Phosphatase 67      ALT 12      AST 15      Total Protein 7.0     DRUG SCREEN,URINE - Normal    Amphetamine Screen, Urine Presumptive Negative      Barbiturate Screen, Urine Presumptive Negative      Benzodiazepines Screen, Urine Presumptive Negative      Cannabinoid Screen, Urine Presumptive Negative      Cocaine Metabolite Screen, Urine Presumptive Negative      Fentanyl Screen, Urine Presumptive Negative      Opiate Screen, Urine Presumptive Negative      Oxycodone Screen, Urine Presumptive Negative      PCP Screen, Urine Presumptive Negative      Narrative:     Drug screen results are presumptive and should not be used to assess   compliance with prescribed medication. Contact the performing [facility] laboratory   to add-on definitive confirmatory testing if clinically indicated.    Toxicology screening results are reported qualitatively. The concentration must   be greater than or equal to the cutoff to be reported as positive. The concentration   at which the screening test can detect an individual drug or metabolite varies.   The absence of expected drug(s) and/or drug metabolite(s) may indicate non-compliance,   inappropriate timing of specimen collection relative to drug administration, poor drug   absorption, diluted/adulterated urine, or limitations of testing. For medical purposes   only; not valid for forensic use.    Interpretive  "questions should be directed to the laboratory medical directors.   MAGNESIUM - Normal    Magnesium 2.20     GREEN TOP    Extra Tube Hold for add-ons.       Transthoracic Echo (TTE) Complete   Final Result      XR chest 2 views   Final Result   Mild pulmonary vascular congestion.   Signed by Isaac Watson MD            ED Course & Medical Decision Making     Medications   hydrALAZINE (Apresoline) injection 10 mg (10 mg intravenous Given 2/26/24 1910)   amLODIPine (Norvasc) tablet 10 mg (10 mg oral Given 2/26/24 2124)   furosemide (Lasix) injection 40 mg (40 mg intravenous Given 2/26/24 2124)   cloNIDine (Catapres) tablet 0.1 mg (0.1 mg oral Given 2/26/24 2124)   nitroglycerin (Nitrostat) SL tablet 0.4 mg (0.4 mg sublingual Given 2/26/24 2226)     Diagnoses as of 02/27/24 2050   Acute congestive heart failure, unspecified heart failure type (CMS/HCC)   Hypertension, unspecified type   PUNEET (acute kidney injury) (CMS/[facility])     Heart Rate:  [44-66]   Respirations:  [10-29]   BP: (123-214)/()   Height:  [175.3 cm (5' 9\")]   Weight:  [74.8 kg (165 lb)]   Pulse Ox:  [94 %-100 %]      Jerrod Jimenez is a 53 y.o. male with past medical history of hypertension and hyperlipidemia who presents with complaint of hypertension and shortness of breath.  Patient initially with significant hypertension with a BP of 214/76 at its highest.  This is most likely secondary to noncompliance.  I do concern for possible hypertensive urgency/emergency, valvular normality, organ failure, cardiac arrhythmia, ACS, PE.  EKG did show slow atrial fibrillation with a ventricular rate of 49 bpm with no prior EKG for comparison although no obvious ST elevations noted in history of chronic atrial fibrillation.  Initial BMP did show a creatinine 1.61 without any prior lab work for comparison and could be chronic in nature although unsure.  Could be related to new PUNEET.  BUN otherwise normal and not prerenal in nature.  Bilirubin mildly elevated " 1.6 and abdominal pain most likely nonspecific.  LFTs otherwise normal.  BNP was elevated at 669 and troponin was mildly elevated at 30 with no active chest pain and repeat stable at 33.  No significant leukocytosis or anemia.  Chest x-ray did show mild pulmonary vascular congestion and given his elevated BNP I do have concern for new onset CHF with need for possible admission and further diuresis as well as hypertensive urgency/emergency.  He was treated with multiple antihypertensive medications as well as Lasix and otherwise will be admitted to the hospitalist team for further management.  He did have improvement of blood pressure on reexamination with systolic in the 150s.    Procedures   Procedures    Diagnosis     1. Acute congestive heart failure, unspecified heart failure type (CMS/HCC)    2. Hypertension, unspecified type    3. PUNEET (acute kidney injury) (CMS/HCC)    4. Essential hypertension    5. Primary hypertension    6. Persistent atrial fibrillation (CMS/HCC)    7. Unspecified systolic (congestive) heart failure (CMS/HCC)        Disposition     Admitted to hospitalist team, discussed differential and findings with patient as well as any family members at bedside.      ED Prescriptions       Medication Sig Dispense Start Date End Date Auth. Provider    apixaban (Eliquis) 5 mg tablet Take 1 tablet (5 mg) by mouth every 12 hours. 60 tablet 2/27/2024 3/28/2024 SHARON Lott    prazosin (Minipress) 1 mg capsule Take 1 capsule (1 mg) by mouth 2 times a day. 60 capsule 2/27/2024 3/28/2024 SHARON Lott    amLODIPine (Norvasc) 10 mg tablet Take 1 tablet (10 mg) by mouth once daily. 30 tablet 2/27/2024 3/28/2024 SHARON Lott    atorvastatin (Lipitor) 40 mg tablet Take 1 tablet (40 mg) by mouth once daily. 30 tablet 2/27/2024 3/28/2024 SHARON Lott    spironolactone (Aldactone) 25 mg tablet Take 1 tablet (25 mg) by mouth once daily. 30 tablet 2/27/2024 3/28/2024  Rowan Bower, APRN-CNP    furosemide (Lasix) 20 mg tablet  (Status: Discontinued) Take 1 tablet (20 mg) by mouth once daily. 30 tablet 2/27/2024 2/27/2024 Rowan Bower, APRN-CNP               David Odonnell,   02/27/24 2053

## 2024-02-27 NOTE — DISCHARGE SUMMARY
Discharge Diagnosis  Essential hypertension    Discharge Meds     Your medication list        START taking these medications        Instructions Last Dose Given Next Dose Due   apixaban 5 mg tablet  Commonly known as: Eliquis      Take 1 tablet (5 mg) by mouth every 12 hours.       prazosin 1 mg capsule  Commonly known as: Minipress      Take 1 capsule (1 mg) by mouth 2 times a day.              CONTINUE taking these medications        Instructions Last Dose Given Next Dose Due   amLODIPine 10 mg tablet  Commonly known as: Norvasc      Take 1 tablet (10 mg) by mouth once daily.       atorvastatin 40 mg tablet  Commonly known as: Lipitor      Take 1 tablet (40 mg) by mouth once daily.       spironolactone 25 mg tablet  Commonly known as: Aldactone      Take 1 tablet (25 mg) by mouth once daily.              STOP taking these medications      carvedilol 12.5 mg tablet  Commonly known as: Coreg        furosemide 20 mg tablet  Commonly known as: Lasix                  Where to Get Your Medications        These medications were sent to [facility]  [address], [address], [place] [id number]      Hours: 8 AM to 6 PM Mon-Fri, 9 AM to 1 PM Saturday Phone: [phone]   amLODIPine 10 mg tablet  apixaban 5 mg tablet  atorvastatin 40 mg tablet  prazosin 1 mg capsule  spironolactone 25 mg tablet         Test Results Pending At Discharge  Pending Labs       No current pending labs.            Hospital Course  Jerrod Jimenez is a 53 y.o. male with PMH significant for HTN, HLP, atrial fibrillation s/p DCCV (9/14/21) no longer on AC who presented with c/o elevated BP.  Referred to the ER by urgent care due to elevated BP and bradycardia on EKG.   He initially presented to the urgent care for refills of his prescriptions.  He endorses mild exertional dypsnea, which is similar to prior CHF exacerbation.  Denies fever, chills, nausea, vomiting, CP, palpitations, dizziness/lightheadedness, or increased LE edema.    Reports he has been out of his prescriptions for 2 weeks..  He was able to show me a list on my chart which includes norvasc, spironolactone, lasix, lipitor, and coreg.   He reports he has been off the Eliquis for at least 1 year.  He has not had an ECHO for several years.  Denies nicotine, ETOH, or illicit substances.       Patient seen and evaluated by cardiology.  Echo reviewed by cardiologist and okay to discharge.  Plan to stop Coreg and start prazosin.  No need to restart Lasix at this time.  Follow-up with cardiology in 1 month.  Meds to beds provided as patient ran out of meds to weeks ago.  Plan to also restart Eliquis    Pertinent Physical Exam At Time of Discharge  Physical Exam  Constitutional:       General: He is not in acute distress.     Appearance: Normal appearance.   HENT:      Head: Normocephalic and atraumatic.   Eyes:      Extraocular Movements: Extraocular movements intact.      Conjunctiva/sclera: Conjunctivae normal.      Pupils: Pupils are equal, round, and reactive to light.   Cardiovascular:      Rate and Rhythm: Normal rate and regular rhythm.      Pulses: Normal pulses.      Heart sounds: Normal heart sounds. No murmur heard.  Pulmonary:      Effort: Pulmonary effort is normal.      Breath sounds: Normal breath sounds. No wheezing or rhonchi.   Chest:      Chest wall: No tenderness.   Abdominal:      General: Abdomen is flat. Bowel sounds are normal. There is no distension.      Palpations: Abdomen is soft. There is no mass.      Tenderness: There is no abdominal tenderness.   Musculoskeletal:         General: Normal range of motion.      Cervical back: Normal range of motion and neck supple.      Right lower leg: No edema.      Left lower leg: No edema.   Skin:     General: Skin is warm and dry.      Capillary Refill: Capillary refill takes less than 2 seconds.   Neurological:      General: No focal deficit present.      Mental Status: He is alert and oriented to person, place, and  time.   Psychiatric:         Mood and Affect: Mood normal.         Outpatient Follow-Up  No future appointments.  Fu with pcp and cardiology    Rowan Bower, ISABEL-CNP

## 2024-02-27 NOTE — CONSULTS
"Inpatient consult to Cardiology  Consult performed by: Sarah Vasquez, APRN-CNP  Consult ordered by: Jayda Yousif PA-C  Reason for consult: \"bradycardia, history of A-fib status post DCCV 9/14/2021, HFrEF\"      History Of Present Illness:    Mr. Jimenez is a 53 year old male with a PMHx of HFpEF, HTN, AF(s/p cardioversion 9/2021) who presents to the ED with mild dyspnea on exertion and elevated BP.  Cardiology was consulted for \"bradycardia, history of A-fib status post DCCV 9/14/2021, HFrEF\"     According to patient over the last 2 weeks has been increasing dyspnea on exertion.  Says he ran out of his blood pressure pills and tried to get them filled through his primary care physician however 1 was unable to refill.  Patient is a  for occupation and walks around on a daily basis was noticing increasing dyspnea on exertion.  He denies any kind of lower extremity edema, chest pain, shortness of breath at rest, denies orthopnea, denies lightheadedness.  Says he does have a history of atrial fibrillation was taken off his blood thinner about a year ago.     Afebrile, heart rate 46, blood pressure on admit was 212/129, 99% on room air.  Notable labs on admission BUNs/CR 22/1.61, T. bili  2.2, H&H 13.3/40.5, high sensitive troponins 30/33/38, urine toxicology screen negative, chest x-ray shows mild congestion.  Patient was placed on his home medications as well as given 40 mg of IV Lasix push, 0.1 mg clonidine, hydralazine 10 mg IV push and 1 sublingual nitroglycerin.  Blood pressure is now 154/95.  EKG shows atrial fibrillation rates in the 50s no signs of ischemia.  Telemetry in the ER has been off; no documented heart rates.    Home cardiac medications include Norvasc 10 mg daily, Aldactone 25 mg daily, Coreg 12.5 mg twice daily, Lipitor 40 mg daily, Lasix 20 mg daily(was taken off of this he claims it while ago)    Past Cardiology Tests (Last 3 Years):  9/14/2021: successful conversion to NSR   "   ECHO 9/2021 [facility] The left ventricle is normal in size. There is moderate left ventricular   hypertrophy. Left ventricular systolic function is normal. EF = 64 ± 5% (2D   biplane) Left ventricular diastolic function was not evaluated due to AF.   - The right ventricle is normal in size. Right ventricular systolic function is   mildly decreased.   - The left atrial cavity is severely dilated.   - The right atrial cavity is mildly dilated.   - There is moderate (2+) tricuspid valve regurgitation.   - Estimated right ventricular systolic pressure is likely underestimated due to a   weak or incomplete tricuspid regurgitation signal and is, at least, 69 mmHg   consistent with moderately severe pulmonary hypertension. Estimated right atrial   pressure is 15 mmHg based on IVC assessment.   - There is mild (1+) mitral valve regurgitation.        NM stress test [facility] 9/2021 1. SPECT Perfusion Study: Non-diagnostic due to Rest only study; normal   resting perfusion.    2. Left ventricle is normal in size. The left ventricle systolic   function is normal.    3. Right ventricle is normal in size. The right ventricle systolic   function is normal.    4. This is an indeterminate risk scan.   Gated Rest FBP    LVEF % 54       Past Medical History:  He has a past medical history of (HFpEF) heart failure with preserved ejection fraction (CMS/HCC), Atrial fibrillation (CMS/HCC), HLD (hyperlipidemia), and Hypertension.    Past Surgical History:  He has a past surgical history that includes Cardioversion (09/14/2021).      Social History:  He reports that he has quit smoking. His smoking use included cigarettes. He has never used smokeless tobacco. He reports that he does not currently use alcohol. He reports that he does not currently use drugs.    Family History:  No family history on file.     Allergies:  Patient has no known allergies.    ROS:  10 point review of systems including (Constitutional, Eyes, ENMT, Respiratory, Cardiac,  "Gastrointestinal, Neurological, Psychiatric, and Hematologic) was performed and is otherwise negative.    Objective Data:  Last Recorded Vitals:  Vitals:    24 0230 24 0256 24 0302 24 0500   BP: (!) 151/104 (!) 179/111 (!) 178/114 (!) 154/95   BP Location:    Right arm   Patient Position:    Lying   Pulse: 58 51  (!) 47   Resp: 15 15  16   Temp:       SpO2: 96% 100% 97% 95%   Weight:    74.8 kg (165 lb)   Height:    1.753 m (5' 9\")     Medical Gas Therapy: None (Room air)  Weight  Av.8 kg (165 lb)  Min: 74.8 kg (165 lb)  Max: 74.8 kg (165 lb)      LABS:  CMP:  Results from last 7 days   Lab Units 24  0500 24  1811   SODIUM mmol/L 138 139   POTASSIUM mmol/L 3.6 3.8   CHLORIDE mmol/L 102 105   CO2 mmol/L 24 26   ANION GAP mmol/L 16 12   BUN mg/dL 20 22   CREATININE mg/dL 1.37* 1.61*   EGFR mL/min/1.73m*2 61 51*   MAGNESIUM mg/dL 2.20  --    ALBUMIN g/dL  --  4.1   ALT U/L  --  13   AST U/L  --  15   BILIRUBIN TOTAL mg/dL  --  1.6*     CBC:  Results from last 7 days   Lab Units 24  0500 24  1811   WBC AUTO x10*3/uL 7.0 6.3   HEMOGLOBIN g/dL 13.1* 13.3*   HEMATOCRIT % 40.5* 40.5*   PLATELETS AUTO x10*3/uL 147* 160   MCV fL 94 92     COAG:     ABO: No results found for: \"ABO\"  HEME/ENDO:     CARDIAC:   Results from last 7 days   Lab Units 24  0500 24  2019 24  1811   TROPHS ng/L 30* 33* 30*   BNP pg/mL  --   --  669*             Last I/O:  No intake or output data in the 24 hours ending 24 0840  Net IO Since Admission: No IO data has been entered for this period [24 0840]      Imaging Results:  ECG 12 lead    Result Date: 2024  Atrial fibrillation with slow ventricular response ST & T wave abnormality, consider inferolateral ischemia Abnormal ECG No previous ECGs available    XR chest 2 views    Result Date: 2024  STUDY: Chest Radiographs;  2024 6:17 PM. INDICATION: Shortness of breath. COMPARISON: None ACCESSION NUMBER(S): " [id number] ORDERING CLINICIAN: KORY CANCHOLA TECHNIQUE:  Frontal and lateral chest. FINDINGS: CARDIOMEDIASTINAL SILHOUETTE: Cardiomediastinal silhouette is normal in size and configuration. Mild pulmonary vascular congestion present.  LUNGS: Lungs are clear.  ABDOMEN: No remarkable upper abdominal findings.  BONES: No acute osseous changes.    Mild pulmonary vascular congestion. Signed by Isaac Watson MD      Inpatient Medications:  Scheduled medications   Medication Dose Route Frequency   • amLODIPine  10 mg oral Daily   • apixaban  5 mg oral q12h   • atorvastatin  40 mg oral Nightly   • carvedilol  12.5 mg oral BID with meals   • docusate sodium  100 mg oral BID   • furosemide  40 mg intravenous 2 times per day   • melatonin  3 mg oral Daily   • pantoprazole  40 mg oral Daily before breakfast    Or   • pantoprazole  40 mg intravenous Daily before breakfast   • perflutren lipid microspheres  0.5-10 mL of dilution intravenous Once in imaging   • spironolactone  25 mg oral Daily     PRN medications   Medication   • acetaminophen   • dicyclomine     Continuous Medications   Medication Dose Last Rate       Outpatient Medications:  Current Outpatient Medications   Medication Instructions   • amLODIPine (NORVASC) 10 mg, oral, Daily RT   • atorvastatin (Lipitor) 40 mg tablet 1 tablet, oral, Nightly   • carvedilol (Coreg) 12.5 mg tablet 1 tablet, oral, 2 times daily with meals   • furosemide (LASIX) 20 mg, oral, Daily   • spironolactone (ALDACTONE) 25 mg, oral, Daily RT       Physical Exam:  General:  Patient is awake, alert, and oriented.  Patient is in no acute distress.  HEENT:  Pupils equal and reactive.  Normocephalic.  Moist mucosa.    Neck:  No thyromegaly.  Normal Jugular Venous Pressure.  Cardiovascular:  Regular rate and rhythm.  Normal S1 and S2.  Pulmonary:  Clear to auscultation bilaterally.  Abdomen:  Soft. Non-tender.   Non-distended.  Positive bowel sounds.  Lower Extremities:  2+ pedal pulses. No LE  "edema.  Neurologic:  Cranial nerves intact.  No focal deficit.   Skin: Skin warm and dry, normal skin turgor.   Psychiatric: Normal affect.     Assessment/Plan    Mr. Jimenez is a 51 year old male with a PMHx of HFpEF, HTN, AF(s/p cardioversion 9/2021) who presents to the ED with mild dyspnea on exertion and elevated BP.  Cardiology was consulted for \"bradycardia, history of A-fib status post DCCV 9/14/2021, HFrEF\"    #Acute on chronic diastolic heart failure 2/2 uncontrolled HTN  #Hx of AF  #Elevated Troponin 2/2 ADHF exacerbation  -We will obtain a transthoracic echocardiogram for structural evaluation including ejection fraction, assessment of regional wall motion abnormalities or valvular disease, and further evaluation of hemodynamics.   -Admit , chest x-ray with mild congestion  -[id number] of 2  -Current EKG shows atrial fibrillation rates in the 50s-> recommend restarting Eliquis 5 mg twice daily  -HS trops low flat trend-> 30/33/30 ->non-MI troponin elevation / acute non-traumatic myocardial injury. Core measures do not apply.   -Already received 40 mg IV Lasix push once  -Home cardiac medications include Norvasc 10mg daily, Aldactone 25mg daily, Coreg 12.5mg twice daily, Lipitor 40 mg daily  -Stop home carvedilol start prazosin 1 mg twice daily  -Will need follow-up with cardiology in 2 to 3 weeks post discharge  -Can follow-up with Dr. Alonzo Blanchard for Cardiology     Code Status:  Full Code    I spent 45 minutes in the professional and overall care of this patient.        Sarah Vasquez, APRN-CNP   "

## 2024-02-27 NOTE — H&P
History Of Present Illness  Jerrod Jimenez is a 53 y.o. male with PMH significant for HTN, HLP, atrial fibrillation s/p DCCV (9/14/21) no longer on AC who presented with c/o elevated BP.  Referred to the ER by urgent care due to elevated BP and bradycardia on EKG.   He initially presented to the urgent care for refills of his prescriptions.  He endorses mild exertional dypsnea, which is similar to prior CHF exacerbation.  Denies fever, chills, nausea, vomiting, CP, palpitations, dizziness/lightheadedness, or increased LE edema.   Reports he has been out of his prescriptions for 2 weeks..  He was able to show me a list on my chart which includes norvasc, spironolactone, lasix, lipitor, and coreg.   He reports he has been off the Eliquis for at least 1 year.  He has not had an ECHO for several years.  Denies nicotine, ETOH, or illicit substances.      Past Medical History  Past Medical History:   Diagnosis Date    (HFpEF) heart failure with preserved ejection fraction (CMS/HCC)     Atrial fibrillation (CMS/HCC)     HLD (hyperlipidemia)     Hypertension        Surgical History  Past Surgical History:   Procedure Laterality Date    CARDIOVERSION  09/14/2021    DCCV for atrial fib with RVR       Social History  Social History     Socioeconomic History    Marital status: Single     Spouse name: None    Number of children: None    Years of education: None    Highest education level: None   Occupational History    None   Tobacco Use    Smoking status: Former     Types: Cigarettes    Smokeless tobacco: Never   Substance and Sexual Activity    Alcohol use: Not Currently    Drug use: Not Currently    Sexual activity: None   Other Topics Concern    None   Social History Narrative    None     Social Determinants of Health     Financial Resource Strain: Not on file   Food Insecurity: Not on file   Transportation Needs: Not on file   Physical Activity: Not on file   Stress: Not on file   Social Connections: Not on file   Intimate  Partner Violence: Not on file   Housing Stability: Not on file        Family History  No family history on file.     Allergies  Allergies as of 02/26/2024    (No Known Allergies)        Review of Systems  Review of Systems   Constitutional: Negative.    HENT: Negative.     Eyes: Negative.    Respiratory:  Positive for shortness of breath.    Cardiovascular:  Negative for chest pain, palpitations and leg swelling.   Gastrointestinal:  Negative for abdominal pain, nausea and vomiting.   Endocrine: Negative.    Genitourinary: Negative.    Musculoskeletal: Negative.    Skin: Negative.    Allergic/Immunologic: Negative.    Neurological: Negative.    Hematological: Negative.    Psychiatric/Behavioral: Negative.     All other systems reviewed and are negative.      Relevant results reviewed   PROVIDER notes  Nursing notes  MEDS:  Current Facility-Administered Medications   Medication Dose Route Frequency Provider Last Rate Last Admin    acetaminophen (Tylenol) tablet 975 mg  975 mg oral q6h PRN Jayda Yousif PA-C   975 mg at 02/27/24 0302    amLODIPine (Norvasc) tablet 10 mg  10 mg oral Daily Chaka Clements PharmD        apixaban (Eliquis) tablet 5 mg  5 mg oral q12h Jayda Yousif PA-C        atorvastatin (Lipitor) tablet 40 mg  40 mg oral Nightly Jayda Yousif PA-C        carvedilol (Coreg) tablet 12.5 mg  12.5 mg oral BID with meals Jayda Yousif PA-C        docusate sodium (Colace) capsule 100 mg  100 mg oral BID Jayda Yousif PA-C        furosemide (Lasix) injection 40 mg  40 mg intravenous 2 times per day Jayda Yousif PA-C        melatonin tablet 3 mg  3 mg oral Daily Jayda Yousif PA-C        pantoprazole (ProtoNix) EC tablet 40 mg  40 mg oral Daily before breakfast Jayda Yousif PA-C        Or    pantoprazole (ProtoNix) injection 40 mg  40 mg intravenous Daily before breakfast Jayda Yousif PA-C        perflutren lipid microspheres (Definity) injection 0.5-10 mL of dilution  0.5-10 mL  of dilution intravenous Once in imaging Jayda Yousif PA-C        spironolactone (Aldactone) tablet 25 mg  25 mg oral Daily Chaka Clements, Laureen         Current Outpatient Medications   Medication Sig Dispense Refill    amLODIPine (Norvasc) 10 mg tablet Take 1 tablet (10 mg) by mouth once daily.      atorvastatin (Lipitor) 40 mg tablet Take 1 tablet (40 mg) by mouth once daily at bedtime.      carvedilol (Coreg) 12.5 mg tablet Take 1 tablet (12.5 mg) by mouth 2 times a day with meals.      furosemide (Lasix) 20 mg tablet Take 1 tablet (20 mg) by mouth once daily.      spironolactone (Aldactone) 25 mg tablet Take 1 tablet (25 mg) by mouth once daily.        LABS:  Results for orders placed or performed during the hospital encounter of 02/26/24 (from the past 24 hour(s))   CBC with Differential   Result Value Ref Range    WBC 6.3 4.4 - 11.3 x10*3/uL    nRBC 0.0 0.0 - 0.0 /100 WBCs    RBC 4.40 (L) 4.50 - 5.90 x10*6/uL    Hemoglobin 13.3 (L) 13.5 - 17.5 g/dL    Hematocrit 40.5 (L) 41.0 - 52.0 %    MCV 92 80 - 100 fL    MCH 30.2 26.0 - 34.0 pg    MCHC 32.8 32.0 - 36.0 g/dL    RDW 15.4 (H) 11.5 - 14.5 %    Platelets 160 150 - 450 x10*3/uL    Neutrophils % 64.1 40.0 - 80.0 %    Immature Granulocytes %, Automated 0.3 0.0 - 0.9 %    Lymphocytes % 21.3 13.0 - 44.0 %    Monocytes % 9.8 2.0 - 10.0 %    Eosinophils % 3.5 0.0 - 6.0 %    Basophils % 1.0 0.0 - 2.0 %    Neutrophils Absolute 4.04 1.20 - 7.70 x10*3/uL    Immature Granulocytes Absolute, Automated 0.02 0.00 - 0.70 x10*3/uL    Lymphocytes Absolute 1.34 1.20 - 4.80 x10*3/uL    Monocytes Absolute 0.62 0.10 - 1.00 x10*3/uL    Eosinophils Absolute 0.22 0.00 - 0.70 x10*3/uL    Basophils Absolute 0.06 0.00 - 0.10 x10*3/uL   Comprehensive Metabolic Panel   Result Value Ref Range    Glucose 107 (H) 74 - 99 mg/dL    Sodium 139 136 - 145 mmol/L    Potassium 3.8 3.5 - 5.3 mmol/L    Chloride 105 98 - 107 mmol/L    Bicarbonate 26 21 - 32 mmol/L    Anion Gap 12 10 - 20 mmol/L     Urea Nitrogen 22 6 - 23 mg/dL    Creatinine 1.61 (H) 0.50 - 1.30 mg/dL    eGFR 51 (L) >60 mL/min/1.73m*2    Calcium 9.3 8.6 - 10.3 mg/dL    Albumin 4.1 3.4 - 5.0 g/dL    Alkaline Phosphatase 68 33 - 120 U/L    Total Protein 7.1 6.4 - 8.2 g/dL    AST 15 9 - 39 U/L    Bilirubin, Total 1.6 (H) 0.0 - 1.2 mg/dL    ALT 13 10 - 52 U/L   Brain Natriuretic Peptide   Result Value Ref Range     (H) 0 - 99 pg/mL   Troponin I, High Sensitivity   Result Value Ref Range    Troponin I, High Sensitivity 30 (H) 0 - 20 ng/L   Troponin I, High Sensitivity   Result Value Ref Range    Troponin I, High Sensitivity 33 (H) 0 - 20 ng/L   Green Top   Result Value Ref Range    Extra Tube Hold for add-ons.    DRUG SCREEN,URINE   Result Value Ref Range    Amphetamine Screen, Urine Presumptive Negative Presumptive Negative    Barbiturate Screen, Urine Presumptive Negative Presumptive Negative    Benzodiazepines Screen, Urine Presumptive Negative Presumptive Negative    Cannabinoid Screen, Urine Presumptive Negative Presumptive Negative    Cocaine Metabolite Screen, Urine Presumptive Negative Presumptive Negative    Fentanyl Screen, Urine Presumptive Negative Presumptive Negative    Opiate Screen, Urine Presumptive Negative Presumptive Negative    Oxycodone Screen, Urine Presumptive Negative Presumptive Negative    PCP Screen, Urine Presumptive Negative Presumptive Negative   CBC   Result Value Ref Range    WBC 7.0 4.4 - 11.3 x10*3/uL    nRBC 0.0 0.0 - 0.0 /100 WBCs    RBC 4.31 (L) 4.50 - 5.90 x10*6/uL    Hemoglobin 13.1 (L) 13.5 - 17.5 g/dL    Hematocrit 40.5 (L) 41.0 - 52.0 %    MCV 94 80 - 100 fL    MCH 30.4 26.0 - 34.0 pg    MCHC 32.3 32.0 - 36.0 g/dL    RDW 15.5 (H) 11.5 - 14.5 %    Platelets 147 (L) 150 - 450 x10*3/uL      IMAGING:  XR chest 2 views   Final Result   Mild pulmonary vascular congestion.   Signed by Isaac Watson MD             PHYSICAL EXAM  BP (!) 154/95 (BP Location: Right arm, Patient Position: Lying)   Pulse  "(!) 47   Temp 36 °C (96.8 °F)   Resp 16   Ht 1.753 m (5' 9\")   Wt 74.8 kg (165 lb)   SpO2 95%   BMI 24.37 kg/m²   PHYSICAL EXAM:  GENERAL: Alert, NAD, cooperative  SKIN: Warm and dry.  No suspicious lesions or rashes.  HEENT:  NCAT, PERRLA, EOMI, nonicteric sclera, MMM, neck supple, trachea midline  LUNGS: Unlabored, diffusely diminished, no significant wheezing, rhonchi, or rales appreciated  CARDS: Irregularly irregular rhythm, atrial fibrillation on EKG/tele  GI: Soft, NTND, BS+, no rebound, no guarding   : no shelley, voids independently   MS/Extremities: WWP, no significant pitting edema, distal pulses intact, moves all extremities  NEURO: A&Ox3, grossly nonfocal exam, speech fluent, follows commands, answers questions appropriately  PSYCH: mood and behavior appropriate    Assessment/Plan:   Principal Problem:    Essential hypertension  Active Problems:    CKD (chronic kidney disease) stage 2, GFR 60-89 ml/min    Mixed hyperlipidemia    Atrial fibrillation (CMS/HCC)    Bradycardia    (HFpEF) heart failure with preserved ejection fraction (CMS/HCC)  -CBC: no leukocytosis, no acute anemia, no thrombocytopenia  -CMP: no acute electrolyte abnormalities, BUN normal, creatinine elevated, BUN/creatinine ratio 13 which suggests this is chronic and stable, no recent labs for comparison, no acute liver dysfunction appreciated   -HS TAMAR: 30 --> 33  -BNP: 669  -utox negative   -CXR:  mild pulmonary vascular congestion   -monitor on tele  -maintain K > 4.0 and Mg > 2.0  --> replete PRN   -strict I/O, daily weights  -appreciate cardiology consult  -received laxix 40 mg IV in the ER   -continue lasix 40 mg IV BID 8a/2p for now  -continue diuresis as per cardiology   -EKG: atrial fibrillation --> he is s/p DCCV cardioversion 9/14/21 with conversion to NSR, and Eliquis was stopped --> restart eliquis po BID for now and await cardiology recs  -ECHO ordered (last ECHO 7/2021: EF 59%.  LV diastolic function not eval due to " AF. No significant valvular abnormalities)  -heart healthy/carb controlled diet   -repeat labs in AM   -GI ppx: protonix, bowel regimen   -VTE ppx: Eliquis BID.    Total time spent [including but not limited to]: Obtaining and reviewing patient medical records/history, obtaining a separate history,  examining and assessing the patient, providing  and education, placing pertinent orders for labs/tests/medications,  communicating with the patient/family/health team, documenting in the patient's EMR/formulation of this note, independently interpreting results and data, coordinating care:   55 minutes, with greater than 50% spent in personal discussion with patient and/or family    Jayda Yousif PA-C

## 2024-02-27 NOTE — NURSING NOTE
Discharge paperwork gone over with patient; no questions. IV taken out with no issues. Patient waiting for meds to beds. Daughter will be picking pt up. Patient belongings packed at bedside.

## 2024-02-27 NOTE — PROGRESS NOTES
Transitional Care Coordination Progress Note:  Plan per Medical/Surgical team: treatment of CHF & HTN with IV lasix, norvasc, clonidine, coreg hydralazine, aldactone, cardio consult, ECHO pending  Status: Observation  Payor source: [employer]  Discharge disposition: Home alone   Potential Barriers: /137 to 154/95, HR 47  ADOD: 2/27/2024  CARLEY Wright RN, BSN Transitional Care Coordinator ED# [phone]      02/27/24 0633   Discharge Planning   Living Arrangements Alone   Support Systems Children   Assistance Needed cardio work up   Type of Residence Private residence   Number of Stairs to Enter Residence 0   Number of Stairs Within Residence 0   Home or Post Acute Services None   Patient expects to be discharged to: Home alone   Does the patient need discharge transport arranged? Yes   RoundTrip coordination needed? Yes   Has discharge transport been arranged? No   Financial Resource Strain   How hard is it for you to pay for the very basics like food, housing, medical care, and heating? Not hard   Housing Stability   In the last 12 months, was there a time when you were not able to pay the mortgage or rent on time? N   In the last 12 months, how many places have you lived? 1   In the last 12 months, was there a time when you did not have a steady place to sleep or slept in a shelter (including now)? N   Transportation Needs   In the past 12 months, has lack of transportation kept you from medical appointments or from getting medications? no   In the past 12 months, has lack of transportation kept you from meetings, work, or from getting things needed for daily living? No

## 2024-02-27 NOTE — PROGRESS NOTES
Home alone      02/27/24 0632   Current Planned Discharge Disposition   Current Planned Discharge Disposition Home

## 2024-02-27 NOTE — PROGRESS NOTES
Pharmacy Medication History Review    Jerrod Jimenez is a 53 y.o. male admitted for Essential hypertension. Pharmacy reviewed the patient's [id number] medications and allergies for accuracy.    The list below reflectives the updated PTA list. Please review each medication in order reconciliation for additional clarification and justification.  Prior to Admission Medications   Prescriptions Last Dose Informant     amLODIPine (Norvasc) 10 mg tablet 2/26/2024      Sig: Take 1 tablet (10 mg) by mouth once daily.   atorvastatin (Lipitor) 40 mg tablet 2/26/2024 at am      Sig: Take 1 tablet (40 mg) by mouth once daily.   carvedilol (Coreg) 12.5 mg tablet 2/26/2024      Sig: Take 1 tablet (12.5 mg) by mouth 2 times a day with meals.             spironolactone (Aldactone) 25 mg tablet 2/26/2024      Sig: Take 1 tablet (25 mg) by mouth once daily.      Facility-Administered Medications: None      Spoke to the patient, patient told me he hasn't taken Lasix for a few mos.  Took all meds yesterday morning        The list below reflectives the updated allergy list. Please review each documented allergy for additional clarification and justification.  Allergies  Reviewed by Tyshawn Palmer RN on 2/26/2024   No Known Allergies         Below are additional concerns with the patient's PTA list.      Victoria Webb CPhT

## 2024-02-29 LAB
Q ONSET: 210 MS
QRS COUNT: 8 BEATS
QRS DURATION: 92 MS
QT INTERVAL: 450 MS
QTC CALCULATION(BAZETT): 406 MS
QTC FREDERICIA: 420 MS
R AXIS: 0 DEGREES
T AXIS: 241 DEGREES
T OFFSET: 435 MS
VENTRICULAR RATE: 49 BPM

## 2024-03-21 DIAGNOSIS — I48.19 PERSISTENT ATRIAL FIBRILLATION (MULTI): ICD-10-CM

## 2024-03-21 DIAGNOSIS — I10 ESSENTIAL HYPERTENSION: Chronic | ICD-10-CM

## 2024-03-21 DIAGNOSIS — I50.9 ACUTE CONGESTIVE HEART FAILURE, UNSPECIFIED HEART FAILURE TYPE (MULTI): ICD-10-CM

## 2024-03-28 RX ORDER — AMLODIPINE BESYLATE 10 MG/1
10 TABLET ORAL
Qty: 30 TABLET | Refills: 0 | OUTPATIENT
Start: 2024-03-28 | End: 2024-04-27

## 2024-03-28 RX ORDER — PRAZOSIN HYDROCHLORIDE 1 MG/1
1 CAPSULE ORAL 2 TIMES DAILY
Qty: 60 CAPSULE | Refills: 0 | OUTPATIENT
Start: 2024-03-28 | End: 2024-04-27

## 2024-03-28 RX ORDER — SPIRONOLACTONE 25 MG/1
25 TABLET ORAL
Qty: 30 TABLET | Refills: 0 | OUTPATIENT
Start: 2024-03-28 | End: 2024-04-27

## 2024-03-28 RX ORDER — ATORVASTATIN CALCIUM 40 MG/1
40 TABLET, FILM COATED ORAL DAILY
Qty: 30 TABLET | Refills: 0 | OUTPATIENT
Start: 2024-03-28 | End: 2024-04-27

## 2024-03-31 DIAGNOSIS — I50.9 ACUTE CONGESTIVE HEART FAILURE, UNSPECIFIED HEART FAILURE TYPE (MULTI): ICD-10-CM

## 2024-03-31 DIAGNOSIS — I10 ESSENTIAL HYPERTENSION: Chronic | ICD-10-CM

## 2024-03-31 DIAGNOSIS — I48.19 PERSISTENT ATRIAL FIBRILLATION (MULTI): ICD-10-CM

## 2024-03-31 RX ORDER — SPIRONOLACTONE 25 MG/1
25 TABLET ORAL
Qty: 30 TABLET | Refills: 0 | OUTPATIENT
Start: 2024-03-31 | End: 2024-04-30

## 2024-03-31 RX ORDER — AMLODIPINE BESYLATE 10 MG/1
10 TABLET ORAL
Qty: 30 TABLET | Refills: 0 | OUTPATIENT
Start: 2024-03-31 | End: 2024-04-30

## 2024-03-31 RX ORDER — ATORVASTATIN CALCIUM 40 MG/1
40 TABLET, FILM COATED ORAL DAILY
Qty: 30 TABLET | Refills: 0 | OUTPATIENT
Start: 2024-03-31 | End: 2024-04-30

## 2024-03-31 RX ORDER — PRAZOSIN HYDROCHLORIDE 1 MG/1
1 CAPSULE ORAL 2 TIMES DAILY
Qty: 60 CAPSULE | Refills: 0 | OUTPATIENT
Start: 2024-03-31 | End: 2024-04-30

## 2024-04-04 DIAGNOSIS — I48.19 PERSISTENT ATRIAL FIBRILLATION (MULTI): ICD-10-CM

## 2024-04-04 DIAGNOSIS — I50.9 ACUTE CONGESTIVE HEART FAILURE, UNSPECIFIED HEART FAILURE TYPE (MULTI): ICD-10-CM

## 2024-04-04 DIAGNOSIS — I10 ESSENTIAL HYPERTENSION: Chronic | ICD-10-CM

## 2024-04-04 RX ORDER — ATORVASTATIN CALCIUM 40 MG/1
40 TABLET, FILM COATED ORAL DAILY
Qty: 30 TABLET | Refills: 0 | OUTPATIENT
Start: 2024-04-04 | End: 2024-05-04

## 2024-04-04 RX ORDER — PRAZOSIN HYDROCHLORIDE 1 MG/1
1 CAPSULE ORAL 2 TIMES DAILY
Qty: 60 CAPSULE | Refills: 0 | OUTPATIENT
Start: 2024-04-04 | End: 2024-05-04

## 2024-04-04 RX ORDER — AMLODIPINE BESYLATE 10 MG/1
10 TABLET ORAL
Qty: 30 TABLET | Refills: 0 | OUTPATIENT
Start: 2024-04-04 | End: 2024-05-04

## 2024-04-04 RX ORDER — SPIRONOLACTONE 25 MG/1
25 TABLET ORAL
Qty: 30 TABLET | Refills: 0 | OUTPATIENT
Start: 2024-04-04 | End: 2024-05-04

## 2024-04-05 DIAGNOSIS — I10 ESSENTIAL HYPERTENSION: Chronic | ICD-10-CM

## 2024-04-05 DIAGNOSIS — I48.19 PERSISTENT ATRIAL FIBRILLATION (MULTI): ICD-10-CM

## 2024-04-05 DIAGNOSIS — I50.9 ACUTE CONGESTIVE HEART FAILURE, UNSPECIFIED HEART FAILURE TYPE (MULTI): ICD-10-CM

## 2024-04-09 RX ORDER — SPIRONOLACTONE 25 MG/1
25 TABLET ORAL
Qty: 30 TABLET | Refills: 0 | OUTPATIENT
Start: 2024-04-09 | End: 2024-05-09

## 2024-04-09 RX ORDER — ATORVASTATIN CALCIUM 40 MG/1
40 TABLET, FILM COATED ORAL DAILY
Qty: 30 TABLET | Refills: 0 | OUTPATIENT
Start: 2024-04-09 | End: 2024-05-09

## 2024-04-09 RX ORDER — AMLODIPINE BESYLATE 10 MG/1
10 TABLET ORAL
Qty: 30 TABLET | Refills: 0 | OUTPATIENT
Start: 2024-04-09 | End: 2024-05-09

## 2024-04-09 RX ORDER — PRAZOSIN HYDROCHLORIDE 1 MG/1
1 CAPSULE ORAL 2 TIMES DAILY
Qty: 60 CAPSULE | Refills: 0 | OUTPATIENT
Start: 2024-04-09 | End: 2024-05-09

## 2024-04-18 ENCOUNTER — APPOINTMENT (OUTPATIENT)
Dept: CARDIOLOGY | Facility: HOSPITAL | Age: 54
End: 2024-04-18
Payer: COMMERCIAL

## 2024-04-19 ENCOUNTER — APPOINTMENT (OUTPATIENT)
Dept: CARDIOLOGY | Facility: HOSPITAL | Age: 54
End: 2024-04-19
Payer: COMMERCIAL

## 2024-04-28 DIAGNOSIS — I48.19 PERSISTENT ATRIAL FIBRILLATION (MULTI): ICD-10-CM

## 2024-04-28 DIAGNOSIS — I50.9 ACUTE CONGESTIVE HEART FAILURE, UNSPECIFIED HEART FAILURE TYPE (MULTI): ICD-10-CM

## 2024-04-28 DIAGNOSIS — I10 ESSENTIAL HYPERTENSION: Chronic | ICD-10-CM

## 2024-05-01 ENCOUNTER — TELEPHONE (OUTPATIENT)
Dept: CARDIOLOGY | Facility: HOSPITAL | Age: 54
End: 2024-05-01

## 2024-05-01 DIAGNOSIS — I48.19 PERSISTENT ATRIAL FIBRILLATION (MULTI): ICD-10-CM

## 2024-05-01 DIAGNOSIS — I10 ESSENTIAL HYPERTENSION: Chronic | ICD-10-CM

## 2024-05-01 DIAGNOSIS — I50.9 ACUTE CONGESTIVE HEART FAILURE, UNSPECIFIED HEART FAILURE TYPE (MULTI): ICD-10-CM

## 2024-05-01 PROCEDURE — RXMED WILLOW AMBULATORY MEDICATION CHARGE

## 2024-05-01 RX ORDER — ATORVASTATIN CALCIUM 40 MG/1
40 TABLET, FILM COATED ORAL DAILY
Qty: 30 TABLET | Refills: 0 | Status: SHIPPED | OUTPATIENT
Start: 2024-05-01 | End: 2024-06-01

## 2024-05-01 RX ORDER — AMLODIPINE BESYLATE 10 MG/1
10 TABLET ORAL
Qty: 30 TABLET | Refills: 0 | OUTPATIENT
Start: 2024-05-01 | End: 2024-05-31

## 2024-05-01 RX ORDER — PRAZOSIN HYDROCHLORIDE 1 MG/1
1 CAPSULE ORAL 2 TIMES DAILY
Qty: 60 CAPSULE | Refills: 0 | OUTPATIENT
Start: 2024-05-01 | End: 2024-05-31

## 2024-05-01 RX ORDER — PRAZOSIN HYDROCHLORIDE 1 MG/1
1 CAPSULE ORAL 2 TIMES DAILY
Qty: 60 CAPSULE | Refills: 0 | Status: SHIPPED | OUTPATIENT
Start: 2024-05-01 | End: 2024-06-01

## 2024-05-01 RX ORDER — SPIRONOLACTONE 25 MG/1
25 TABLET ORAL
Qty: 30 TABLET | Refills: 0 | Status: SHIPPED | OUTPATIENT
Start: 2024-05-01 | End: 2024-06-01

## 2024-05-01 RX ORDER — ATORVASTATIN CALCIUM 40 MG/1
40 TABLET, FILM COATED ORAL DAILY
Qty: 30 TABLET | Refills: 0 | OUTPATIENT
Start: 2024-05-01 | End: 2024-05-31

## 2024-05-01 RX ORDER — AMLODIPINE BESYLATE 10 MG/1
10 TABLET ORAL
Qty: 30 TABLET | Refills: 0 | Status: SHIPPED | OUTPATIENT
Start: 2024-05-01 | End: 2024-06-01

## 2024-05-01 RX ORDER — SPIRONOLACTONE 25 MG/1
25 TABLET ORAL
Qty: 30 TABLET | Refills: 0 | OUTPATIENT
Start: 2024-05-01 | End: 2024-05-31

## 2024-05-01 RX ORDER — AMLODIPINE BESYLATE 10 MG/1
10 TABLET ORAL
Qty: 30 TABLET | Refills: 0 | Status: CANCELLED | OUTPATIENT
Start: 2024-05-01 | End: 2024-05-31

## 2024-05-02 ENCOUNTER — PHARMACY VISIT (OUTPATIENT)
Dept: PHARMACY | Facility: CLINIC | Age: 54
End: 2024-05-02
Payer: MEDICARE

## 2024-06-03 ENCOUNTER — APPOINTMENT (OUTPATIENT)
Dept: CARDIOLOGY | Facility: HOSPITAL | Age: 54
End: 2024-06-03
Payer: COMMERCIAL

## 2024-06-03 DIAGNOSIS — I48.19 PERSISTENT ATRIAL FIBRILLATION (MULTI): ICD-10-CM

## 2024-06-14 ENCOUNTER — OFFICE VISIT (OUTPATIENT)
Dept: CARDIOLOGY | Facility: HOSPITAL | Age: 54
End: 2024-06-14
Payer: COMMERCIAL

## 2024-06-14 ENCOUNTER — PHARMACY VISIT (OUTPATIENT)
Dept: PHARMACY | Facility: CLINIC | Age: 54
End: 2024-06-14
Payer: MEDICARE

## 2024-06-14 VITALS
WEIGHT: 194 LBS | HEIGHT: 69 IN | SYSTOLIC BLOOD PRESSURE: 160 MMHG | DIASTOLIC BLOOD PRESSURE: 109 MMHG | BODY MASS INDEX: 28.73 KG/M2 | HEART RATE: 53 BPM

## 2024-06-14 DIAGNOSIS — I50.32 CHRONIC HEART FAILURE WITH PRESERVED EJECTION FRACTION (MULTI): Primary | ICD-10-CM

## 2024-06-14 DIAGNOSIS — I10 ESSENTIAL HYPERTENSION: Chronic | ICD-10-CM

## 2024-06-14 DIAGNOSIS — I48.19 PERSISTENT ATRIAL FIBRILLATION (MULTI): ICD-10-CM

## 2024-06-14 LAB
ATRIAL RATE: 416 BPM
Q ONSET: 221 MS
QRS COUNT: 8 BEATS
QRS DURATION: 84 MS
QT INTERVAL: 428 MS
QTC CALCULATION(BAZETT): 401 MS
QTC FREDERICIA: 410 MS
R AXIS: 79 DEGREES
T AXIS: 261 DEGREES
T OFFSET: 435 MS
VENTRICULAR RATE: 53 BPM

## 2024-06-14 PROCEDURE — 93005 ELECTROCARDIOGRAM TRACING: CPT | Performed by: INTERNAL MEDICINE

## 2024-06-14 PROCEDURE — 3080F DIAST BP >= 90 MM HG: CPT | Performed by: INTERNAL MEDICINE

## 2024-06-14 PROCEDURE — 3077F SYST BP >= 140 MM HG: CPT | Performed by: INTERNAL MEDICINE

## 2024-06-14 PROCEDURE — G2211 COMPLEX E/M VISIT ADD ON: HCPCS | Performed by: INTERNAL MEDICINE

## 2024-06-14 PROCEDURE — 99214 OFFICE O/P EST MOD 30 MIN: CPT | Performed by: INTERNAL MEDICINE

## 2024-06-14 PROCEDURE — 1036F TOBACCO NON-USER: CPT | Performed by: INTERNAL MEDICINE

## 2024-06-14 PROCEDURE — RXMED WILLOW AMBULATORY MEDICATION CHARGE

## 2024-06-14 RX ORDER — SPIRONOLACTONE 25 MG/1
25 TABLET ORAL
Qty: 90 TABLET | Refills: 3 | Status: SHIPPED | OUTPATIENT
Start: 2024-06-14 | End: 2025-06-14

## 2024-06-14 RX ORDER — PRAZOSIN HYDROCHLORIDE 2 MG/1
2 CAPSULE ORAL 2 TIMES DAILY
Qty: 180 CAPSULE | Refills: 3 | Status: SHIPPED | OUTPATIENT
Start: 2024-06-14 | End: 2025-06-14

## 2024-06-14 RX ORDER — ATORVASTATIN CALCIUM 40 MG/1
40 TABLET, FILM COATED ORAL DAILY
Qty: 90 TABLET | Refills: 3 | Status: SHIPPED | OUTPATIENT
Start: 2024-06-14 | End: 2025-06-14

## 2024-06-14 RX ORDER — AMLODIPINE BESYLATE 10 MG/1
10 TABLET ORAL
Qty: 90 TABLET | Refills: 3 | Status: SHIPPED | OUTPATIENT
Start: 2024-06-14 | End: 2025-06-14

## 2024-08-28 ENCOUNTER — APPOINTMENT (OUTPATIENT)
Dept: CARDIOLOGY | Facility: HOSPITAL | Age: 54
End: 2024-08-28
Payer: COMMERCIAL

## 2024-08-28 DIAGNOSIS — I48.19 PERSISTENT ATRIAL FIBRILLATION (MULTI): ICD-10-CM

## 2024-09-09 ENCOUNTER — OFFICE VISIT (OUTPATIENT)
Dept: CARDIOLOGY | Facility: HOSPITAL | Age: 54
End: 2024-09-09
Payer: COMMERCIAL

## 2024-09-09 VITALS
BODY MASS INDEX: 27.8 KG/M2 | OXYGEN SATURATION: 98 % | DIASTOLIC BLOOD PRESSURE: 104 MMHG | HEART RATE: 54 BPM | HEIGHT: 69 IN | WEIGHT: 187.7 LBS | SYSTOLIC BLOOD PRESSURE: 154 MMHG

## 2024-09-09 DIAGNOSIS — I48.19 PERSISTENT ATRIAL FIBRILLATION (MULTI): ICD-10-CM

## 2024-09-09 DIAGNOSIS — I10 ESSENTIAL HYPERTENSION: Primary | Chronic | ICD-10-CM

## 2024-09-09 DIAGNOSIS — I50.32 CHRONIC HEART FAILURE WITH PRESERVED EJECTION FRACTION (MULTI): Chronic | ICD-10-CM

## 2024-09-09 PROCEDURE — 3080F DIAST BP >= 90 MM HG: CPT | Performed by: INTERNAL MEDICINE

## 2024-09-09 PROCEDURE — 93005 ELECTROCARDIOGRAM TRACING: CPT | Performed by: INTERNAL MEDICINE

## 2024-09-09 PROCEDURE — 99214 OFFICE O/P EST MOD 30 MIN: CPT | Performed by: INTERNAL MEDICINE

## 2024-09-09 PROCEDURE — 3008F BODY MASS INDEX DOCD: CPT | Performed by: INTERNAL MEDICINE

## 2024-09-09 PROCEDURE — 1036F TOBACCO NON-USER: CPT | Performed by: INTERNAL MEDICINE

## 2024-09-09 PROCEDURE — 3077F SYST BP >= 140 MM HG: CPT | Performed by: INTERNAL MEDICINE

## 2024-09-09 NOTE — PROGRESS NOTES
"Chief Complaint:   No chief complaint on file.     History Of Present Illness:    Jerrod Jimenez is a 54 y.o. male here for followup. He has a history of hypertension, hyperlipidemia, pre-diabetes, atrial fibrillation, and HFpEF. He was hospitalized 2/2024 with acute HFpEF and uncontrolled BP after a period off his medications. TTE showed a preserved EF.    He reports feeling well overall. Denies shortness of breath or other cardiovascular symptoms. Reports elevated BP's at home. He reports taking no medications BID including Eliquis and prazosin.         Last Recorded Vitals:  Vitals:    09/09/24 1514   BP: (!) 154/104   BP Location: Left arm   Pulse: 54   SpO2: 98%   Weight: 85.1 kg (187 lb 11.2 oz)   Height: 1.753 m (5' 9\")             Allergies:  Patient has no known allergies.    Outpatient Medications:  Current Outpatient Medications   Medication Instructions    amLODIPine (NORVASC) 10 mg, oral, Daily RT    atorvastatin (LIPITOR) 40 mg, oral, Daily    Eliquis 5 mg, oral, Every 12 hours    prazosin (MINIPRESS) 2 mg, oral, 2 times daily    spironolactone (ALDACTONE) 25 mg, oral, Daily RT         Physical Exam:  Gen Well appearing middle aged male sitting up in NAD. Body mass index is 27.72 kg/m².   CV irregularly irregular. Jorge. No m/r/g appreciated. No JVD or leg edema.   Pulm Lungs clear with normal respiratory effort.  Neuro Alert and conversant. Grossly nonfocal.       I reviewed the patient's ECG -  Atrial fibrillation @ 54 bpm. Diffuse ST/T abnormality     I reviewed most recent imaging / labs / and office notes      Assessment/Plan   1. Hypertension   BP uncontrolled. Has only been taking Prazosin once daily as above. He was instructed to take it BID. Monitoring. Low threshold to increase his dose further if her pressures remain uncontrolled next visit.    2. Atrial fibrillation  CVR. On Eliquis--he was instructed to take it BID as prescribed. Will continue a 'rate control' strategy for now.     3.  " HFpEF  Euvolemic. NYHA 1 symptoms as of late. His present regimen is to continue.     4. Hyperlipidemia   Last LDL well controlled (67 '22). His Lipitor was refilled.        Followup 2 months        Alonzo Blanchard MD

## 2024-09-10 LAB
ATRIAL RATE: 416 BPM
Q ONSET: 226 MS
QRS COUNT: 9 BEATS
QRS DURATION: 86 MS
QT INTERVAL: 408 MS
QTC CALCULATION(BAZETT): 386 MS
QTC FREDERICIA: 393 MS
R AXIS: 58 DEGREES
T AXIS: 252 DEGREES
T OFFSET: 430 MS
VENTRICULAR RATE: 54 BPM

## 2024-09-12 PROCEDURE — RXMED WILLOW AMBULATORY MEDICATION CHARGE

## 2024-09-13 ENCOUNTER — PHARMACY VISIT (OUTPATIENT)
Dept: PHARMACY | Facility: CLINIC | Age: 54
End: 2024-09-13
Payer: MEDICARE

## 2024-11-04 ENCOUNTER — APPOINTMENT (OUTPATIENT)
Dept: CARDIOLOGY | Facility: HOSPITAL | Age: 54
End: 2024-11-04
Payer: COMMERCIAL

## 2024-11-25 ENCOUNTER — APPOINTMENT (OUTPATIENT)
Dept: CARDIOLOGY | Facility: HOSPITAL | Age: 54
End: 2024-11-25
Payer: COMMERCIAL

## 2024-11-25 DIAGNOSIS — I48.19 PERSISTENT ATRIAL FIBRILLATION (MULTI): Primary | ICD-10-CM

## 2024-12-09 ENCOUNTER — APPOINTMENT (OUTPATIENT)
Dept: CARDIOLOGY | Facility: HOSPITAL | Age: 54
End: 2024-12-09
Payer: COMMERCIAL

## 2024-12-09 VITALS
BODY MASS INDEX: 28.29 KG/M2 | SYSTOLIC BLOOD PRESSURE: 134 MMHG | HEIGHT: 69 IN | HEART RATE: 75 BPM | DIASTOLIC BLOOD PRESSURE: 88 MMHG | WEIGHT: 191 LBS | OXYGEN SATURATION: 97 %

## 2024-12-09 DIAGNOSIS — E78.5 HYPERLIPIDEMIA, UNSPECIFIED HYPERLIPIDEMIA TYPE: ICD-10-CM

## 2024-12-09 DIAGNOSIS — I10 ESSENTIAL HYPERTENSION: Primary | Chronic | ICD-10-CM

## 2024-12-09 DIAGNOSIS — I48.19 PERSISTENT ATRIAL FIBRILLATION (MULTI): ICD-10-CM

## 2024-12-09 DIAGNOSIS — I50.32 CHRONIC HEART FAILURE WITH PRESERVED EJECTION FRACTION: ICD-10-CM

## 2024-12-09 PROCEDURE — 99213 OFFICE O/P EST LOW 20 MIN: CPT | Performed by: INTERNAL MEDICINE

## 2024-12-09 PROCEDURE — 1036F TOBACCO NON-USER: CPT | Performed by: INTERNAL MEDICINE

## 2024-12-09 PROCEDURE — 3079F DIAST BP 80-89 MM HG: CPT | Performed by: INTERNAL MEDICINE

## 2024-12-09 PROCEDURE — 3008F BODY MASS INDEX DOCD: CPT | Performed by: INTERNAL MEDICINE

## 2024-12-09 PROCEDURE — RXMED WILLOW AMBULATORY MEDICATION CHARGE

## 2024-12-09 PROCEDURE — 3075F SYST BP GE 130 - 139MM HG: CPT | Performed by: INTERNAL MEDICINE

## 2024-12-09 RX ORDER — AMLODIPINE BESYLATE 10 MG/1
10 TABLET ORAL
Qty: 90 TABLET | Refills: 3 | Status: SHIPPED | OUTPATIENT
Start: 2024-12-09 | End: 2025-12-09

## 2024-12-09 RX ORDER — PRAZOSIN HYDROCHLORIDE 2 MG/1
2 CAPSULE ORAL 2 TIMES DAILY
Qty: 180 CAPSULE | Refills: 3 | Status: SHIPPED | OUTPATIENT
Start: 2024-12-09 | End: 2025-12-09

## 2024-12-09 RX ORDER — ATORVASTATIN CALCIUM 40 MG/1
40 TABLET, FILM COATED ORAL DAILY
Qty: 90 TABLET | Refills: 3 | Status: SHIPPED | OUTPATIENT
Start: 2024-12-09 | End: 2025-12-09

## 2024-12-09 RX ORDER — SPIRONOLACTONE 25 MG/1
25 TABLET ORAL
Qty: 90 TABLET | Refills: 3 | Status: SHIPPED | OUTPATIENT
Start: 2024-12-09 | End: 2025-12-09

## 2024-12-09 NOTE — PROGRESS NOTES
"Chief Complaint:   No chief complaint on file.     History Of Present Illness:    Jerrod Jimenez is a 54 y.o. male here for followup. He has a history of hypertension, hyperlipidemia, pre-diabetes, atrial fibrillation, and HFpEF. He was hospitalized 2/2024 with acute HFpEF and uncontrolled BP after a period off his medications. TTE showed a preserved EF.    He reports feeling well overall. Denies shortness of breath or other cardiovascular symptoms. Reports BP's more in the 130's with home checks. Has not been exercising much as of late.    Last Recorded Vitals:  Vitals:    12/09/24 1400   BP: 134/88   Pulse: 75   SpO2: 97%   Weight: 86.6 kg (191 lb)   Height: 1.753 m (5' 9\")             Allergies:  Patient has no known allergies.    Outpatient Medications:  Current Outpatient Medications   Medication Instructions    amLODIPine (NORVASC) 10 mg, oral, Daily RT    atorvastatin (LIPITOR) 40 mg, oral, Daily    Eliquis 5 mg, oral, Every 12 hours    prazosin (MINIPRESS) 2 mg, oral, 2 times daily    spironolactone (ALDACTONE) 25 mg, oral, Daily RT         Physical Exam:  Gen Well appearing middle aged male sitting up in NAD. Body mass index is 28.21 kg/m².   CV irregularly irregular. Jorge. No m/r/g appreciated. No JVD or leg edema.   Pulm Lungs clear with normal respiratory effort.  Neuro Alert and conversant. Grossly nonfocal.         I reviewed most recent imaging / labs / and office notes      Assessment/Plan   1. Hypertension   BP improved and reasonable. His present regimen is to continue. Weight loss and exercise encouraged. Will consider increasing prazosin further next visit for a more consistent 120 mmhg systolic or lower.    2. Atrial fibrillation  CVR. On Eliquis for anticoagulation. Will continue a 'rate control' strategy for now.     3.  HFpEF  Euvolemic. NYHA 1 symptoms as of late. His present regimen is to continue.     4. Hyperlipidemia   Last LDL well controlled (67 '22). His atorvastatin is to continue " indefinitely.        Follow-up 6 months        Alonzo Blanchard MD

## 2024-12-10 PROCEDURE — RXMED WILLOW AMBULATORY MEDICATION CHARGE

## 2024-12-11 ENCOUNTER — PHARMACY VISIT (OUTPATIENT)
Dept: PHARMACY | Facility: CLINIC | Age: 54
End: 2024-12-11
Payer: MEDICARE

## 2025-03-04 PROCEDURE — RXMED WILLOW AMBULATORY MEDICATION CHARGE

## 2025-03-14 ENCOUNTER — PHARMACY VISIT (OUTPATIENT)
Dept: PHARMACY | Facility: CLINIC | Age: 55
End: 2025-03-14
Payer: MEDICARE

## 2025-06-02 ENCOUNTER — APPOINTMENT (OUTPATIENT)
Dept: CARDIOLOGY | Facility: HOSPITAL | Age: 55
End: 2025-06-02
Payer: COMMERCIAL

## 2025-06-06 PROCEDURE — RXMED WILLOW AMBULATORY MEDICATION CHARGE

## 2025-06-11 ENCOUNTER — PHARMACY VISIT (OUTPATIENT)
Dept: PHARMACY | Facility: CLINIC | Age: 55
End: 2025-06-11
Payer: MEDICARE

## 2025-07-02 ENCOUNTER — APPOINTMENT (OUTPATIENT)
Dept: CARDIOLOGY | Facility: HOSPITAL | Age: 55
End: 2025-07-02
Payer: COMMERCIAL

## 2025-09-04 DIAGNOSIS — I50.32 CHRONIC HEART FAILURE WITH PRESERVED EJECTION FRACTION: ICD-10-CM

## 2025-09-04 DIAGNOSIS — I48.19 PERSISTENT ATRIAL FIBRILLATION (MULTI): ICD-10-CM

## 2025-09-04 DIAGNOSIS — I10 ESSENTIAL HYPERTENSION: Chronic | ICD-10-CM

## 2025-09-04 PROCEDURE — RXMED WILLOW AMBULATORY MEDICATION CHARGE

## 2025-09-06 ENCOUNTER — PHARMACY VISIT (OUTPATIENT)
Dept: PHARMACY | Facility: CLINIC | Age: 55
End: 2025-09-06
Payer: MEDICARE

## 2025-09-18 ENCOUNTER — APPOINTMENT (OUTPATIENT)
Dept: CARDIOLOGY | Facility: HOSPITAL | Age: 55
End: 2025-09-18
Payer: COMMERCIAL

## 2025-10-06 ENCOUNTER — APPOINTMENT (OUTPATIENT)
Dept: CARDIOLOGY | Facility: HOSPITAL | Age: 55
End: 2025-10-06
Payer: COMMERCIAL